# Patient Record
Sex: MALE | Race: WHITE | Employment: FULL TIME | URBAN - NONMETROPOLITAN AREA
[De-identification: names, ages, dates, MRNs, and addresses within clinical notes are randomized per-mention and may not be internally consistent; named-entity substitution may affect disease eponyms.]

---

## 2022-01-04 ENCOUNTER — HOSPITAL ENCOUNTER (EMERGENCY)
Age: 52
Discharge: HOME | End: 2022-01-04
Payer: COMMERCIAL

## 2022-01-04 VITALS
DIASTOLIC BLOOD PRESSURE: 88 MMHG | OXYGEN SATURATION: 98 % | SYSTOLIC BLOOD PRESSURE: 139 MMHG | RESPIRATION RATE: 16 BRPM | TEMPERATURE: 98.3 F | HEART RATE: 78 BPM

## 2022-01-04 DIAGNOSIS — U07.1 COVID-19 VIRUS INFECTION: Primary | ICD-10-CM

## 2022-01-04 LAB
FLU A ANTIGEN: NEGATIVE
FLU B ANTIGEN: NEGATIVE
SARS-COV-2, NAA: DETECTED

## 2022-01-04 PROCEDURE — G0463 HOSPITAL OUTPT CLINIC VISIT: HCPCS

## 2022-01-04 PROCEDURE — 99202 OFFICE O/P NEW SF 15 MIN: CPT | Performed by: EMERGENCY MEDICINE

## 2022-01-04 PROCEDURE — 99203 OFFICE O/P NEW LOW 30 MIN: CPT

## 2022-01-04 PROCEDURE — 87635 SARS-COV-2 COVID-19 AMP PRB: CPT

## 2022-01-04 PROCEDURE — 87804 INFLUENZA ASSAY W/OPTIC: CPT

## 2022-01-04 ASSESSMENT — ENCOUNTER SYMPTOMS
NAUSEA: 1
ABDOMINAL PAIN: 0
DIARRHEA: 1
SHORTNESS OF BREATH: 0
BACK PAIN: 0
COUGH: 1
RHINORRHEA: 1

## 2022-01-04 NOTE — ED PROVIDER NOTES
[facility]  Urgent Care Encounter       CHIEF COMPLAINT       Chief Complaint   Patient presents with    Fatigue    Cough    Nasal Congestion    Generalized Body Aches    Chills       Nurses Notes reviewed and I agree except as noted in the HPI. HISTORY OF PRESENT ILLNESS   Saul Pabon is a 54 y.o. male who presents for fatigue, occasional cough, nasal congestion, generalized body aches and chills. Symptoms started yesterday but today he was driving to work when the chills occurred. No known fever. He has had one diarrhea episode. No vomiting. Patient has not been vaccinated for COVID-19. He has been exposed to COVID-19. His mother-in-law passed away from COVID-19 3 weeks ago. HPI    REVIEW OF SYSTEMS     Review of Systems   Constitutional: Positive for chills and fatigue. HENT: Positive for rhinorrhea. Respiratory: Positive for cough. Negative for shortness of breath. Cardiovascular: Negative for chest pain. Gastrointestinal: Positive for diarrhea and nausea. Negative for abdominal pain. Musculoskeletal: Negative for back pain. Neurological: Positive for headaches. Negative for dizziness. Psychiatric/Behavioral: Negative for behavioral problems. PAST MEDICAL HISTORY   History reviewed. No pertinent past medical history. SURGICALHISTORY     Patient  has a past surgical history that includes Carpal tunnel release (Bilateral) and Colonoscopy. CURRENT MEDICATIONS       There are no discharge medications for this patient. ALLERGIES     Patient is is allergic to codeine. Patients   There is no immunization history on file for this patient. FAMILY HISTORY     Patient's family history is not on file. SOCIAL HISTORY     Patient  reports that he has never smoked. His smokeless tobacco use includes chew. He reports that he does not use drugs.     PHYSICAL EXAM     ED TRIAGE VITALS  BP: 139/88, Temp: 98.3 °F (36.8 °C), Pulse: 78, Resp: 16, SpO2: 98 %,There is no height or weight on file to calculate BMI.,No LMP for male patient. Physical Exam  Constitutional:       Appearance: Normal appearance. He is ill-appearing. He is not toxic-appearing. HENT:      Head: Normocephalic. Mouth/Throat:      Mouth: Mucous membranes are moist.      Pharynx: Oropharynx is clear. No oropharyngeal exudate or posterior oropharyngeal erythema. Cardiovascular:      Rate and Rhythm: Normal rate and regular rhythm. Pulses: Normal pulses. Heart sounds: Normal heart sounds. Pulmonary:      Effort: Pulmonary effort is normal. No respiratory distress. Breath sounds: Normal breath sounds. No wheezing or rhonchi. Abdominal:      General: Abdomen is flat. Bowel sounds are normal.   Skin:     General: Skin is warm and dry. Capillary Refill: Capillary refill takes less than 2 seconds. Neurological:      General: No focal deficit present. Mental Status: He is alert. Psychiatric:         Mood and Affect: Mood normal.         Behavior: Behavior normal.         DIAGNOSTIC RESULTS     Labs:  Results for orders placed or performed during the hospital encounter of 01/04/22   COVID-19, Rapid   Result Value Ref Range    SARS-CoV-2, MALICK DETECTED (AA) NOT DETECTED   Rapid influenza A/B antigens   Result Value Ref Range    Flu A Antigen Negative NEGATIVE    Flu B Antigen Negative NEGATIVE       IMAGING:    No orders to display         EKG:      URGENT CARE COURSE:     Vitals:    01/04/22 1639   BP: 139/88   Pulse: 78   Resp: 16   Temp: 98.3 °F (36.8 °C)   TempSrc: Temporal   SpO2: 98%       Medications - No data to display         PROCEDURES:  None    FINAL IMPRESSION      1. COVID-19 virus infection          DISPOSITION/ PLAN     Presents for COVID-19 viral infection. Patient has mild symptoms. O2 saturation 98%. No respiratory distress. Patient be discharged and advised to distance from others.   Advised to remain off work for total of 10 days from onset of symptoms. Over-the-counter medications as needed. Continue vitamin C, D, zinc as he is already taking at home. I did discuss monoclonal antibody treatment with the patient. Advised that he can contact his primary care provider if symptoms start to worsen and this is a consideration that he has for treatment. He is also advised to go to the emergency department for worsening cough, chest pain, shortness of breath or any new concerns. PATIENT REFERRED TO:  [address] [place] [id number]      DISCHARGE MEDICATIONS:  There are no discharge medications for this patient. There are no discharge medications for this patient. There are no discharge medications for this patient.       ParticANDREAS Cadena CNP    (Please note that portions of this note were completed with a voice recognition program. Efforts were made to edit the dictations but occasionally words are mis-transcribed.)          ParticANDREAS Cadena CNP  01/04/22 0466

## 2022-01-04 NOTE — Clinical Note
Haydee Nguyen was seen and treated in our emergency department on 1/4/2022. COVID19 virus is suspected. Per the CDC guidelines we recommend home isolation until the following conditions are all met:    1. At least 10 days have passed since symptoms first appeared and  2. At least 24 hours have passed since last fever without the use of fever-reducing medications and  3. Symptoms (e.g., cough, shortness of breath) have improved    If you have any questions or concerns, please don't hesitate to call.     He may return to work/school on 01/13/2022        José Antonio Matthew, APRN - CNP

## 2023-03-24 ENCOUNTER — HOSPITAL ENCOUNTER (INPATIENT)
Age: 53
LOS: 8 days | Discharge: HOME | End: 2023-04-01
Attending: EMERGENCY MEDICINE | Admitting: INTERNAL MEDICINE
Payer: COMMERCIAL

## 2023-03-24 ENCOUNTER — APPOINTMENT (OUTPATIENT)
Dept: GENERAL RADIOLOGY | Age: 53
End: 2023-03-24
Payer: COMMERCIAL

## 2023-03-24 DIAGNOSIS — N17.9 AKI (ACUTE KIDNEY INJURY) (HCC): ICD-10-CM

## 2023-03-24 DIAGNOSIS — A41.9 SEPTICEMIA (HCC): ICD-10-CM

## 2023-03-24 DIAGNOSIS — L03.116 CELLULITIS OF LEFT LOWER EXTREMITY: Primary | ICD-10-CM

## 2023-03-24 PROBLEM — L02.416 CELLULITIS AND ABSCESS OF LEFT LOWER EXTREMITY: Status: ACTIVE | Noted: 2023-03-24

## 2023-03-24 LAB
ANION GAP SERPL CALC-SCNC: 16 MEQ/L (ref 8–16)
BUN SERPL-MCNC: 38 MG/DL (ref 7–22)
CALCIUM SERPL-MCNC: 8.5 MG/DL (ref 8.5–10.5)
CHLORIDE SERPL-SCNC: 92 MEQ/L (ref 98–111)
CO2 SERPL-SCNC: 22 MEQ/L (ref 23–33)
CREAT SERPL-MCNC: 2.5 MG/DL (ref 0.4–1.2)
GFR SERPL CREATININE-BSD FRML MDRD: 30 ML/MIN/1.73M2
GLUCOSE SERPL-MCNC: 181 MG/DL (ref 70–108)
LACTIC ACID, SEPSIS: 3.2 MMOL/L (ref 0.5–1.9)
OSMOLALITY SERPL CALC.SUM OF ELEC: 274.4 MOSMOL/KG (ref 275–300)
POTASSIUM SERPL-SCNC: 3.3 MEQ/L (ref 3.5–5.2)
SODIUM SERPL-SCNC: 130 MEQ/L (ref 135–145)

## 2023-03-24 PROCEDURE — 87186 SC STD MICRODIL/AGAR DIL: CPT

## 2023-03-24 PROCEDURE — 85025 COMPLETE CBC W/AUTO DIFF WBC: CPT

## 2023-03-24 PROCEDURE — 83605 ASSAY OF LACTIC ACID: CPT

## 2023-03-24 PROCEDURE — 73590 X-RAY EXAM OF LOWER LEG: CPT

## 2023-03-24 PROCEDURE — 36415 COLL VENOUS BLD VENIPUNCTURE: CPT

## 2023-03-24 PROCEDURE — 87077 CULTURE AEROBIC IDENTIFY: CPT

## 2023-03-24 PROCEDURE — 99285 EMERGENCY DEPT VISIT HI MDM: CPT

## 2023-03-24 PROCEDURE — 87040 BLOOD CULTURE FOR BACTERIA: CPT

## 2023-03-24 PROCEDURE — 99222 1ST HOSP IP/OBS MODERATE 55: CPT | Performed by: PHYSICIAN ASSISTANT

## 2023-03-24 PROCEDURE — 93005 ELECTROCARDIOGRAM TRACING: CPT | Performed by: STUDENT IN AN ORGANIZED HEALTH CARE EDUCATION/TRAINING PROGRAM

## 2023-03-24 PROCEDURE — 80048 BASIC METABOLIC PNL TOTAL CA: CPT

## 2023-03-24 PROCEDURE — 87801 DETECT AGNT MULT DNA AMPLI: CPT

## 2023-03-24 PROCEDURE — 1200000003 HC TELEMETRY R&B

## 2023-03-24 RX ORDER — 0.9 % SODIUM CHLORIDE 0.9 %
1000 INTRAVENOUS SOLUTION INTRAVENOUS ONCE
Status: COMPLETED | OUTPATIENT
Start: 2023-03-24 | End: 2023-03-25

## 2023-03-24 RX ORDER — 0.9 % SODIUM CHLORIDE 0.9 %
1000 INTRAVENOUS SOLUTION INTRAVENOUS ONCE
Status: COMPLETED | OUTPATIENT
Start: 2023-03-25 | End: 2023-03-25

## 2023-03-24 RX ORDER — HYDROCODONE BITARTRATE AND ACETAMINOPHEN 5; 325 MG/1; MG/1
1 TABLET ORAL ONCE
Status: COMPLETED | OUTPATIENT
Start: 2023-03-24 | End: 2023-03-25

## 2023-03-24 ASSESSMENT — PAIN DESCRIPTION - LOCATION: LOCATION: LEG

## 2023-03-24 ASSESSMENT — PAIN DESCRIPTION - ORIENTATION: ORIENTATION: LEFT;LOWER

## 2023-03-24 ASSESSMENT — PAIN SCALES - GENERAL: PAINLEVEL_OUTOF10: 10

## 2023-03-24 ASSESSMENT — PAIN - FUNCTIONAL ASSESSMENT: PAIN_FUNCTIONAL_ASSESSMENT: 0-10

## 2023-03-25 ENCOUNTER — APPOINTMENT (OUTPATIENT)
Dept: ULTRASOUND IMAGING | Age: 53
End: 2023-03-25
Payer: COMMERCIAL

## 2023-03-25 ENCOUNTER — APPOINTMENT (OUTPATIENT)
Dept: INTERVENTIONAL RADIOLOGY/VASCULAR | Age: 53
End: 2023-03-25
Payer: COMMERCIAL

## 2023-03-25 LAB
ACB COMPLEX DNA BLD POS QL NAA+NON-PROBE: NOT DETECTED
ANION GAP SERPL CALC-SCNC: 14 MEQ/L (ref 8–16)
B FRAGILIS DNA BLD POS QL NAA+NON-PROBE: NOT DETECTED
BASOPHILS ABSOLUTE: 0 THOU/MM3 (ref 0–0.1)
BASOPHILS ABSOLUTE: 0.1 THOU/MM3 (ref 0–0.1)
BASOPHILS NFR BLD AUTO: 0.4 %
BASOPHILS NFR BLD AUTO: 0.8 %
BLACTX-M ISLT/SPM QL: ABNORMAL
BLAIMP ISLT/SPM QL: ABNORMAL
BLAKPC ISLT/SPM QL: ABNORMAL
BLAOXA-48-LIKE ISLT/SPM QL: ABNORMAL
BLAVIM ISLT/SPM QL: ABNORMAL
BOTTLE TYPE: ABNORMAL
BUN SERPL-MCNC: 44 MG/DL (ref 7–22)
C ALBICANS DNA BLD POS QL NAA+NON-PROBE: NOT DETECTED
C AURIS DNA BLD POS QL NAA+NON-PROBE: NOT DETECTED
C GATTII+NEOFOR DNA BLD POS QL NAA+N-PRB: NOT DETECTED
C GLABRATA DNA BLD POS QL NAA+NON-PROBE: NOT DETECTED
C KRUSEI DNA BLD POS QL NAA+NON-PROBE: NOT DETECTED
C PARAP DNA BLD POS QL NAA+NON-PROBE: NOT DETECTED
C TROPICLS DNA BLD POS QL NAA+NON-PROBE: NOT DETECTED
CA-I BLD ISE-SCNC: 0.94 MMOL/L (ref 1.12–1.32)
CALCIUM SERPL-MCNC: 7.4 MG/DL (ref 8.5–10.5)
CHLORIDE SERPL-SCNC: 97 MEQ/L (ref 98–111)
CO2 SERPL-SCNC: 24 MEQ/L (ref 23–33)
COAG NEG STAPH DNA BLD QL NAA+PROBE: NOT DETECTED
COLISTIN RES MCR-1 ISLT/SPM QL: ABNORMAL
CREAT SERPL-MCNC: 2.4 MG/DL (ref 0.4–1.2)
DEPRECATED RDW RBC AUTO: 42.9 FL (ref 35–45)
DEPRECATED RDW RBC AUTO: 43.8 FL (ref 35–45)
E CLOAC COMP DNA BLD POS NAA+NON-PROBE: NOT DETECTED
E COLI DNA BLD POS QL NAA+NON-PROBE: NOT DETECTED
E FAECALIS DNA BLD POS QL NAA+NON-PROBE: NOT DETECTED
E FAECIUM DNA BLD POS QL NAA+NON-PROBE: NOT DETECTED
EKG ATRIAL RATE: 107 BPM
EKG P AXIS: 10 DEGREES
EKG P-R INTERVAL: 130 MS
EKG Q-T INTERVAL: 326 MS
EKG QRS DURATION: 90 MS
EKG QTC CALCULATION (BAZETT): 435 MS
EKG R AXIS: 1 DEGREES
EKG T AXIS: 5 DEGREES
EKG VENTRICULAR RATE: 107 BPM
ENTEROBACTERALES DNA BLD POS NAA+N-PRB: NOT DETECTED
EOSINOPHIL NFR BLD AUTO: 0 %
EOSINOPHIL NFR BLD AUTO: 0 %
EOSINOPHILS ABSOLUTE: 0 THOU/MM3 (ref 0–0.4)
EOSINOPHILS ABSOLUTE: 0 THOU/MM3 (ref 0–0.4)
ERYTHROCYTE [DISTWIDTH] IN BLOOD BY AUTOMATED COUNT: 13.3 % (ref 11.5–14.5)
ERYTHROCYTE [DISTWIDTH] IN BLOOD BY AUTOMATED COUNT: 13.3 % (ref 11.5–14.5)
GFR SERPL CREATININE-BSD FRML MDRD: 32 ML/MIN/1.73M2
GLUCOSE SERPL-MCNC: 159 MG/DL (ref 70–108)
GP B STREP DNA SPEC QL NAA+PROBE: NOT DETECTED
GP B STREP DNA SPEC QL NAA+PROBE: NOT DETECTED
HAEM INFLU DNA BLD POS QL NAA+NON-PROBE: NOT DETECTED
HCT VFR BLD AUTO: 43 % (ref 42–52)
HCT VFR BLD AUTO: 46.9 % (ref 42–52)
HGB BLD-MCNC: 13.9 GM/DL (ref 14–18)
HGB BLD-MCNC: 15.6 GM/DL (ref 14–18)
IMM GRANULOCYTES # BLD AUTO: 0.08 THOU/MM3 (ref 0–0.07)
IMM GRANULOCYTES # BLD AUTO: 0.09 THOU/MM3 (ref 0–0.07)
IMM GRANULOCYTES NFR BLD AUTO: 0.6 %
IMM GRANULOCYTES NFR BLD AUTO: 0.7 %
K OXYTOCA DNA BLD POS QL NAA+NON-PROBE: NOT DETECTED
K OXYTOCA DNA BLD POS QL NAA+NON-PROBE: NOT DETECTED
KLEBSIELLA SP DNA BLD POS QL NAA+NON-PRB: NOT DETECTED
L MONOCYTOG DNA BLD POS QL NAA+NON-PROBE: NOT DETECTED
LACTATE SERPL-SCNC: 1.9 MMOL/L (ref 0.5–2)
LACTIC ACID, SEPSIS: 2.6 MMOL/L (ref 0.5–1.9)
LYMPHOCYTES ABSOLUTE: 0.6 THOU/MM3 (ref 1–4.8)
LYMPHOCYTES ABSOLUTE: 0.7 THOU/MM3 (ref 1–4.8)
LYMPHOCYTES NFR BLD AUTO: 5.1 %
LYMPHOCYTES NFR BLD AUTO: 5.3 %
MAGNESIUM SERPL-MCNC: 2.1 MG/DL (ref 1.6–2.4)
MCH RBC QN AUTO: 28.8 PG (ref 26–33)
MCH RBC QN AUTO: 29.3 PG (ref 26–33)
MCHC RBC AUTO-ENTMCNC: 32.3 GM/DL (ref 32.2–35.5)
MCHC RBC AUTO-ENTMCNC: 33.3 GM/DL (ref 32.2–35.5)
MCV RBC AUTO: 88.2 FL (ref 80–94)
MCV RBC AUTO: 89 FL (ref 80–94)
MECA ISLT/SPM QL: ABNORMAL
MECA+MECC+MREJ ISLT/SPM QL: ABNORMAL
MONOCYTES ABSOLUTE: 0.9 THOU/MM3 (ref 0.4–1.3)
MONOCYTES ABSOLUTE: 1 THOU/MM3 (ref 0.4–1.3)
MONOCYTES NFR BLD AUTO: 7.1 %
MONOCYTES NFR BLD AUTO: 7.6 %
N MEN DNA BLD POS QL NAA+NON-PROBE: NOT DETECTED
NDM: ABNORMAL
NEUTROPHILS NFR BLD AUTO: 86.2 %
NEUTROPHILS NFR BLD AUTO: 86.2 %
NRBC BLD AUTO-RTO: 0 /100 WBC
NRBC BLD AUTO-RTO: 0 /100 WBC
P AERUGINOSA DNA BLD POS NAA+NON-PROBE: NOT DETECTED
PLATELET # BLD AUTO: 142 THOU/MM3 (ref 130–400)
PLATELET # BLD AUTO: 170 THOU/MM3 (ref 130–400)
PLATELET BLD QL SMEAR: ADEQUATE
PLATELET BLD QL SMEAR: ADEQUATE
PMV BLD AUTO: 10.6 FL (ref 9.4–12.4)
PMV BLD AUTO: 10.7 FL (ref 9.4–12.4)
POTASSIUM SERPL-SCNC: 3.3 MEQ/L (ref 3.5–5.2)
PROTEUS SPP: NOT DETECTED
RBC # BLD AUTO: 4.83 MILL/MM3 (ref 4.7–6.1)
RBC # BLD AUTO: 5.32 MILL/MM3 (ref 4.7–6.1)
S AUREUS DNA BLD POS QL NAA+NON-PROBE: NOT DETECTED
S EPIDERMIDIS DNA BLD POS QL NAA+NON-PRB: NOT DETECTED
S LUGDUNENSIS DNA BLD POS QL NAA+NON-PRB: NOT DETECTED
S MALTOPHILIA DNA BLD POS QL NAA+NON-PRB: NOT DETECTED
S MARCESCENS DNA BLD POS NAA+NON-PROBE: NOT DETECTED
S PYO DNA THROAT QL NAA+PROBE: DETECTED
SALMONELLA DNA BLD POS QL NAA+NON-PROBE: NOT DETECTED
SCAN OF BLOOD SMEAR: NORMAL
SCAN OF BLOOD SMEAR: NORMAL
SEGMENTED NEUTROPHILS ABSOLUTE COUNT: 10.7 THOU/MM3 (ref 1.8–7.7)
SEGMENTED NEUTROPHILS ABSOLUTE COUNT: 11.6 THOU/MM3 (ref 1.8–7.7)
SODIUM SERPL-SCNC: 135 MEQ/L (ref 135–145)
SOURCE OF BLOOD CULTURE: ABNORMAL
STREPTOCOCCUS DNA BLD QL NAA+PROBE: DETECTED
VANA+VANB ISLT/SPM QL: ABNORMAL
WBC # BLD AUTO: 12.4 THOU/MM3 (ref 4.8–10.8)
WBC # BLD AUTO: 13.5 THOU/MM3 (ref 4.8–10.8)

## 2023-03-25 PROCEDURE — 99222 1ST HOSP IP/OBS MODERATE 55: CPT | Performed by: INTERNAL MEDICINE

## 2023-03-25 PROCEDURE — 93010 ELECTROCARDIOGRAM REPORT: CPT | Performed by: INTERNAL MEDICINE

## 2023-03-25 PROCEDURE — 1200000000 HC SEMI PRIVATE

## 2023-03-25 PROCEDURE — 83605 ASSAY OF LACTIC ACID: CPT

## 2023-03-25 PROCEDURE — 80048 BASIC METABOLIC PNL TOTAL CA: CPT

## 2023-03-25 PROCEDURE — 6360000002 HC RX W HCPCS: Performed by: PHYSICIAN ASSISTANT

## 2023-03-25 PROCEDURE — 6370000000 HC RX 637 (ALT 250 FOR IP): Performed by: STUDENT IN AN ORGANIZED HEALTH CARE EDUCATION/TRAINING PROGRAM

## 2023-03-25 PROCEDURE — 2580000003 HC RX 258: Performed by: INTERNAL MEDICINE

## 2023-03-25 PROCEDURE — 36415 COLL VENOUS BLD VENIPUNCTURE: CPT

## 2023-03-25 PROCEDURE — 85025 COMPLETE CBC W/AUTO DIFF WBC: CPT

## 2023-03-25 PROCEDURE — 2580000003 HC RX 258: Performed by: STUDENT IN AN ORGANIZED HEALTH CARE EDUCATION/TRAINING PROGRAM

## 2023-03-25 PROCEDURE — 82330 ASSAY OF CALCIUM: CPT

## 2023-03-25 PROCEDURE — 1200000003 HC TELEMETRY R&B

## 2023-03-25 PROCEDURE — 6360000002 HC RX W HCPCS: Performed by: STUDENT IN AN ORGANIZED HEALTH CARE EDUCATION/TRAINING PROGRAM

## 2023-03-25 PROCEDURE — 6360000002 HC RX W HCPCS

## 2023-03-25 PROCEDURE — 6360000002 HC RX W HCPCS: Performed by: INTERNAL MEDICINE

## 2023-03-25 PROCEDURE — 83735 ASSAY OF MAGNESIUM: CPT

## 2023-03-25 PROCEDURE — 76770 US EXAM ABDO BACK WALL COMP: CPT

## 2023-03-25 PROCEDURE — 2580000003 HC RX 258

## 2023-03-25 PROCEDURE — 2580000003 HC RX 258: Performed by: PHYSICIAN ASSISTANT

## 2023-03-25 PROCEDURE — 6370000000 HC RX 637 (ALT 250 FOR IP): Performed by: PHYSICIAN ASSISTANT

## 2023-03-25 PROCEDURE — 93971 EXTREMITY STUDY: CPT

## 2023-03-25 PROCEDURE — 99232 SBSQ HOSP IP/OBS MODERATE 35: CPT | Performed by: INTERNAL MEDICINE

## 2023-03-25 RX ORDER — ONDANSETRON 4 MG/1
4 TABLET, ORALLY DISINTEGRATING ORAL EVERY 8 HOURS PRN
Status: DISCONTINUED | OUTPATIENT
Start: 2023-03-25 | End: 2023-04-01 | Stop reason: HOSPADM

## 2023-03-25 RX ORDER — ACETAMINOPHEN 325 MG/1
650 TABLET ORAL EVERY 6 HOURS PRN
Status: DISCONTINUED | OUTPATIENT
Start: 2023-03-25 | End: 2023-04-01 | Stop reason: HOSPADM

## 2023-03-25 RX ORDER — POTASSIUM CHLORIDE 29.8 MG/ML
20 INJECTION INTRAVENOUS PRN
Status: DISCONTINUED | OUTPATIENT
Start: 2023-03-25 | End: 2023-03-27

## 2023-03-25 RX ORDER — SODIUM CHLORIDE 9 MG/ML
INJECTION, SOLUTION INTRAVENOUS CONTINUOUS
Status: DISCONTINUED | OUTPATIENT
Start: 2023-03-25 | End: 2023-03-27

## 2023-03-25 RX ORDER — ONDANSETRON 2 MG/ML
4 INJECTION INTRAMUSCULAR; INTRAVENOUS EVERY 6 HOURS PRN
Status: DISCONTINUED | OUTPATIENT
Start: 2023-03-25 | End: 2023-04-01 | Stop reason: HOSPADM

## 2023-03-25 RX ORDER — POLYETHYLENE GLYCOL 3350 17 G/17G
17 POWDER, FOR SOLUTION ORAL DAILY PRN
Status: DISCONTINUED | OUTPATIENT
Start: 2023-03-25 | End: 2023-04-01 | Stop reason: HOSPADM

## 2023-03-25 RX ORDER — SODIUM CHLORIDE 0.9 % (FLUSH) 0.9 %
5-40 SYRINGE (ML) INJECTION PRN
Status: DISCONTINUED | OUTPATIENT
Start: 2023-03-25 | End: 2023-04-01 | Stop reason: HOSPADM

## 2023-03-25 RX ORDER — ACETAMINOPHEN 650 MG/1
650 SUPPOSITORY RECTAL EVERY 6 HOURS PRN
Status: DISCONTINUED | OUTPATIENT
Start: 2023-03-25 | End: 2023-04-01 | Stop reason: HOSPADM

## 2023-03-25 RX ORDER — ENOXAPARIN SODIUM 100 MG/ML
30 INJECTION SUBCUTANEOUS EVERY 12 HOURS
Status: DISCONTINUED | OUTPATIENT
Start: 2023-03-25 | End: 2023-04-01 | Stop reason: HOSPADM

## 2023-03-25 RX ORDER — SODIUM CHLORIDE 9 MG/ML
INJECTION, SOLUTION INTRAVENOUS PRN
Status: DISCONTINUED | OUTPATIENT
Start: 2023-03-25 | End: 2023-04-01 | Stop reason: HOSPADM

## 2023-03-25 RX ORDER — SODIUM CHLORIDE 0.9 % (FLUSH) 0.9 %
5-40 SYRINGE (ML) INJECTION EVERY 12 HOURS SCHEDULED
Status: DISCONTINUED | OUTPATIENT
Start: 2023-03-25 | End: 2023-04-01 | Stop reason: HOSPADM

## 2023-03-25 RX ORDER — POTASSIUM CHLORIDE 7.45 MG/ML
10 INJECTION INTRAVENOUS PRN
Status: DISCONTINUED | OUTPATIENT
Start: 2023-03-25 | End: 2023-03-27

## 2023-03-25 RX ADMIN — AMPICILLIN SODIUM 2000 MG: 2 INJECTION, POWDER, FOR SOLUTION INTRAVENOUS at 20:53

## 2023-03-25 RX ADMIN — CALCIUM GLUCONATE 4000 MG: 98 INJECTION, SOLUTION INTRAVENOUS at 15:45

## 2023-03-25 RX ADMIN — ACETAMINOPHEN 650 MG: 325 TABLET ORAL at 15:52

## 2023-03-25 RX ADMIN — SODIUM CHLORIDE, PRESERVATIVE FREE 10 ML: 5 INJECTION INTRAVENOUS at 08:54

## 2023-03-25 RX ADMIN — ENOXAPARIN SODIUM 30 MG: 100 INJECTION SUBCUTANEOUS at 01:55

## 2023-03-25 RX ADMIN — Medication 2000 MG: at 00:01

## 2023-03-25 RX ADMIN — ENOXAPARIN SODIUM 30 MG: 100 INJECTION SUBCUTANEOUS at 14:02

## 2023-03-25 RX ADMIN — SODIUM CHLORIDE 1000 ML: 9 INJECTION, SOLUTION INTRAVENOUS at 00:03

## 2023-03-25 RX ADMIN — SODIUM CHLORIDE 1000 ML: 9 INJECTION, SOLUTION INTRAVENOUS at 00:02

## 2023-03-25 RX ADMIN — SODIUM CHLORIDE: 9 INJECTION, SOLUTION INTRAVENOUS at 01:52

## 2023-03-25 RX ADMIN — ACETAMINOPHEN 650 MG: 325 TABLET ORAL at 09:02

## 2023-03-25 RX ADMIN — HYDROCODONE BITARTRATE AND ACETAMINOPHEN 1 TABLET: 5; 325 TABLET ORAL at 00:01

## 2023-03-25 RX ADMIN — CEFAZOLIN 2000 MG: 10 INJECTION, POWDER, FOR SOLUTION INTRAVENOUS at 10:23

## 2023-03-25 RX ADMIN — VANCOMYCIN HYDROCHLORIDE 1750 MG: 5 INJECTION, POWDER, LYOPHILIZED, FOR SOLUTION INTRAVENOUS at 03:02

## 2023-03-25 ASSESSMENT — PAIN DESCRIPTION - DESCRIPTORS
DESCRIPTORS: THROBBING
DESCRIPTORS: THROBBING

## 2023-03-25 ASSESSMENT — ENCOUNTER SYMPTOMS
COUGH: 0
SHORTNESS OF BREATH: 0
COLOR CHANGE: 1
NAUSEA: 1
VOMITING: 1
ALLERGIC/IMMUNOLOGIC NEGATIVE: 1
EYES NEGATIVE: 1
RESPIRATORY NEGATIVE: 1
GASTROINTESTINAL NEGATIVE: 1

## 2023-03-25 ASSESSMENT — PAIN SCALES - GENERAL
PAINLEVEL_OUTOF10: 5
PAINLEVEL_OUTOF10: 5
PAINLEVEL_OUTOF10: 9
PAINLEVEL_OUTOF10: 7

## 2023-03-25 ASSESSMENT — PAIN DESCRIPTION - LOCATION
LOCATION: KNEE
LOCATION: LEG
LOCATION: LEG

## 2023-03-25 ASSESSMENT — PAIN DESCRIPTION - ORIENTATION
ORIENTATION: LEFT

## 2023-03-25 NOTE — PROGRESS NOTES
VN completed admission questions with pt wife at this time. Wife stated pt does not take any medications at home. Pt resting in bed with call light in reach. Educated on fall prevention and referred to page 13 of hospital handbook at bedside. Verbalized understanding.  No voiced questions or concerns

## 2023-03-25 NOTE — PROGRESS NOTES
[address] [facility] Pharmacokinetic Monitoring Service - Vancomycin     Jefferson Level is a 50 y.o. male starting on vancomycin therapy for cellulitis of LLE. Pharmacy consulted by Yanci Orozco for monitoring and adjustment. Target Concentration: Goal trough of 10-15 mg/L and AUC/DARÍO <500 mg*hr/L    Additional Antimicrobials: Ancef    Pertinent Laboratory Values: Wt Readings from Last 1 Encounters:   03/25/23 (!) 313 lb 0.9 oz (142 kg)     Temp Readings from Last 1 Encounters:   03/24/23 98.3 °F (36.8 °C) (Oral)     Estimated Creatinine Clearance: 50 mL/min (A) (based on SCr of 2.5 mg/dL (H)). Recent Labs     03/24/23  2238   CREATININE 2.5*   WBC 13.5*     Procalcitonin: none resulted    Pertinent Cultures:  Culture Date Source Results   03/24/23 BCx2    MRSA Nasal Swab: N/A. Non-respiratory infection. Plan:  Vancomycin 1750 mg iv now.   Random level ordered for tomorrow am to help with dosing due to elevated SCr  Pharmacy will continue to monitor patient and adjust therapy as indicated    Thank you for the consult,  Yuliya Badillo [facility]  3/25/2023 1:58 AM

## 2023-03-25 NOTE — ED PROVIDER NOTES
scleral icterus. Conjunctiva/sclera: Conjunctivae normal.   Cardiovascular:      Rate and Rhythm: Normal rate and regular rhythm. Pulses: Normal pulses. Heart sounds: Normal heart sounds. Pulmonary:      Effort: Pulmonary effort is normal. No respiratory distress. Breath sounds: Normal breath sounds. Abdominal:      General: Abdomen is flat. There is no distension. Palpations: Abdomen is soft. Tenderness: There is no abdominal tenderness. There is no guarding or rebound. Musculoskeletal:         General: Normal range of motion. Cervical back: Normal range of motion and neck supple. No rigidity. No muscular tenderness. Comments: Significant left lower extremity erythema to the lateral portion of the lower leg that is warm to the touch tender as well edematous. No active fluctuance no crepitus. DP pulses are 1+ bilaterally. Lymphadenopathy:      Cervical: No cervical adenopathy. Skin:     General: Skin is warm and dry. Capillary Refill: Capillary refill takes less than 2 seconds. Coloration: Skin is not jaundiced. Neurological:      General: No focal deficit present. Mental Status: He is alert and oriented to person, place, and time. Psychiatric:         Mood and Affect: Mood normal.         Behavior: Behavior normal.       FORMAL DIAGNOSTIC RESULTS     RADIOLOGY: Interpretation per the Radiologist below, if available at the time of this note (none if blank):    XR TIBIA FIBULA LEFT (2 VIEWS)   Final Result   Soft tissue edema.       This document has been electronically signed by: Junior Clinton MD on    03/24/2023 11:27 PM          LABS: (none if blank)  Labs Reviewed   CBC WITH AUTO DIFFERENTIAL - Abnormal; Notable for the following components:       Result Value    WBC 13.5 (*)     All other components within normal limits   BASIC METABOLIC PANEL - Abnormal; Notable for the following components:    Sodium 130 (*)     Potassium 3.3 (*)     Chloride 92 (*)     CO2 22 (*)     Glucose 181 (*)     BUN 38 (*)     Creatinine 2.5 (*)     All other components within normal limits   LACTATE, SEPSIS - Abnormal; Notable for the following components:    Lactic Acid, Sepsis 3.2 (*)     All other components within normal limits   OSMOLALITY - Abnormal; Notable for the following components:    Osmolality Calc 274.4 (*)     All other components within normal limits   GLOMERULAR FILTRATION RATE, ESTIMATED - Abnormal; Notable for the following components:    Est, Glom Filt Rate 30 (*)     All other components within normal limits   CULTURE, BLOOD 1   CULTURE, BLOOD 1   ANION GAP   LACTATE, SEPSIS       (Any cultures that may have been sent were not resulted at the time of this patient visit)    [facility] / ED COURSE:     External Documentation Reviewed:         Previous patient encounter documents & history available on EMR was reviewed: Patient was seen on 1/4/2020 for COVID-19. 1)           Differential Diagnosis includes (but not limited to):  Cellulitis, osteomyelitis, necrotizing fasciitis, DVT        Diagnoses Considered but I have low suspicion of:          Decision Rules/Clinical Scores utilized:               See Formal Diagnostic Results above for the lab and radiology tests and orders.     3)  Treatment and Disposition         ED Reassessment:  See ED course         Case discussed with consulting clinician: Geremias Stewart hospitalist         Shared Decision-Making was performed and disposition discussed with the        Patient/Family and questions answered          Social determinants of health impacting treatment or disposition:  none      Summary of Patient Presentation:      ED Course as of 03/24/23 2335   Fri Mar 24, 2023   2329 WBC(!): 13.5 [AL]   2331 Sodium(!): 130 [AL]   2331 Potassium(!): 3.3 [AL]   2331 Chloride(!): 92 [AL]   2331 CO2(!): 22 [AL]   2331 Glucose, Random(!): 181 [AL]   2331 BUN,BUNPL(!): 38 [AL]   2331 Creatinine(!): 2.5 [AL]

## 2023-03-25 NOTE — PLAN OF CARE
Problem: Pain  Goal: Verbalizes/displays adequate comfort level or baseline comfort level  Outcome: Progressing     Problem: Skin/Tissue Integrity  Goal: Absence of new skin breakdown  Description: 1. Monitor for areas of redness and/or skin breakdown  2. Assess vascular access sites hourly  3. Every 4-6 hours minimum:  Change oxygen saturation probe site  4. Every 4-6 hours:  If on nasal continuous positive airway pressure, respiratory therapy assess nares and determine need for appliance change or resting period. Outcome: Progressing     Problem: Infection - Adult  Goal: Absence of infection at discharge  Outcome: Progressing  Flowsheets (Taken 3/24/2023 2231)  Absence of infection at discharge:   Assess and monitor for signs and symptoms of infection   Monitor lab/diagnostic results   Care plan reviewed with patient and  Patient verbalize understanding of the plan of care and contribute to goal setting.

## 2023-03-25 NOTE — PROGRESS NOTES
PROGRESS NOTE      Patient:  Drea Childers      Unit/Bed:6K-23/023-A    YOB: 1970    MRN: [id number]       Acct: [de-identified]     PCP: Howie Wilkerson DO    Date of Admission: 3/24/2023      Assessment/Plan:      Active Hospital Problems    Diagnosis Date Noted    Cellulitis and abscess of left lower extremity [L03.116, L02.416] 03/24/2023       Cellulitis LLE: LLE noted warmth, erythema, mild edema. Continue Ancef for 2-3 more days. US BLE doppler negative for DVT 3/25/23. X-ray LLE unremarkable 2/24/23. Bacteremia/ SIRS criteria: Blood cultures growing Strep pyogenes 3/25/23. Tachycardic, elevated WBC 12.4, lactic acid elevated, LLE likely source of infection. LLE TTP, pt is unable to walk on it due to the pain. Afebrile. Will continue Ancef for 2-3 more days, d/c vancomycin and start Ampicillin 2g IV Q4H per pharmacy recommendations. Lactic acidosis: Pt noted to have elevated lactic acid. Will repeat LA in the AM. Continue NS  ml/ hr.    SHOLA: Cr 2.4 this morning. Likely pre-renal 2/2 poor PO intake, dehydration. Unclear of patient's history, pt is a poor historian. Nephrology consulted, appreciate recommendations. Renal US 3/25/23 negative for hydronephrosis. SHOLA lab work-up. Continue IVF. Hypokalemia: replace as needed    Hypocalcemia: replace as needed      Chief Complaint: leg swelling    Hospital Course:      \"Patient presents to the ED with left lower extremity swelling and redness for the past 3 days. The patient denies any recent injury, wounds, or rash on the lower extremity. The patient has no significant medical history including no history of DVT. There are no fevers, chills, or shortness of breath. Patient is admitted for left lower extremity cellulitis and DVT r/o.\"    3/25/23: Continue Ancef for LLE cellulitis. Blood cultures positive for S. Pyogenes. Will d/c Vancomycin, start Ampicillin. SHOLA noted, Cr 2.4 this morning. Nephrology consulted, appreciate recs. insight        Labs:   Recent Labs     03/24/23 2238 03/25/23 0439   WBC 13.5* 12.4*   HGB 15.6 13.9*   HCT 46.9 43.0    142     Recent Labs     03/24/23 2238 03/25/23 0439   * 135   K 3.3* 3.3*   CL 92* 97*   CO2 22* 24   BUN 38* 44*   CREATININE 2.5* 2.4*   CALCIUM 8.5 7.4*     No results for input(s): AST, ALT, BILIDIR, BILITOT, ALKPHOS in the last 72 hours. No results for input(s): INR in the last 72 hours. No results for input(s): Loy Massed in the last 72 hours. Urinalysis:    No results found for: Herman Scarce, BACTERIA, RBCUA, BLOODU, [address], [place] [address]    Radiology:  US RENAL COMPLETE   Final Result    No evidence of hydronephrosis. **This report has been created using voice recognition software. It may contain minor errors which are inherent in voice recognition technology. **      Final report electronically signed by Dr. Watt Check on 3/25/2023 1:46 PM      VL DUP LOWER EXTREMITY VENOUS LEFT   Final Result   No evidence of a DVT in the left lower extremity. **This report has been created using voice recognition software. It may contain minor errors which are inherent in voice recognition technology. **      Final report electronically signed by Dr. Watt Check on 3/25/2023 11:53 AM      XR TIBIA FIBULA LEFT (2 VIEWS)   Final Result   Soft tissue edema. This document has been electronically signed by: Bc Baltazar MD on    03/24/2023 11:27 PM          Diet: ADULT DIET;  Regular    DVT prophylaxis: [x] Lovenox                                 [] SCDs                                 [] SQ Heparin                                 [] Encourage ambulation           [] Already on Anticoagulation     Disposition:    [x] Home       [] TCU       [] Rehab       [] Psych       [] SNF       [] [place]       [] Other-    Code Status: Full Code    PT/OT Eval Status: working      Electronically signed by Francis Hearn DO on 3/25/2023 at

## 2023-03-25 NOTE — H&P
Concern    Not on file   Social History Narrative    Not on file     Social Determinants of Health     Financial Resource Strain: Not on file   Food Insecurity: Not on file   Transportation Needs: Not on file   Physical Activity: Not on file   Stress: Not on file   Social Connections: Not on file   Intimate Partner Violence: Not on file   Housing Stability: Not on file     FHX: History reviewed. No pertinent family history. Allergies:    Allergies   Allergen Reactions    Codeine      Other reaction(s): Unknown Reaction     Medications:     sodium chloride      sodium chloride 125 mL/hr at 03/25/23 0152      sodium chloride flush  5-40 mL IntraVENous 2 times per day    enoxaparin  30 mg SubCUTAneous Q12H    ceFAZolin  2,000 mg IntraVENous Q8H    vancomycin (VANCOCIN) intermittent dosing (placeholder)   Other RX Placeholder    vancomycin  1,750 mg IntraVENous Once     sodium chloride flush, 5-40 mL, PRN  sodium chloride, , PRN  ondansetron, 4 mg, Q8H PRN   Or  ondansetron, 4 mg, Q6H PRN  polyethylene glycol, 17 g, Daily PRN  acetaminophen, 650 mg, Q6H PRN   Or  acetaminophen, 650 mg, Q6H PRN      Labs:   Recent Results (from the past 24 hour(s))   EKG 12 Lead    Collection Time: 03/24/23 10:27 PM   Result Value Ref Range    Ventricular Rate 107 BPM    Atrial Rate 107 BPM    P-R Interval 130 ms    QRS Duration 90 ms    Q-T Interval 326 ms    QTc Calculation (Bazett) 435 ms    P Axis 10 degrees    R Axis 1 degrees    T Axis 5 degrees   CBC with Auto Differential    Collection Time: 03/24/23 10:38 PM   Result Value Ref Range    WBC 13.5 (H) 4.8 - 10.8 thou/mm3    RBC 5.32 4.70 - 6.10 mill/mm3    Hemoglobin 15.6 14.0 - 18.0 gm/dl    Hematocrit 46.9 42.0 - 52.0 %    MCV 88.2 80.0 - 94.0 fL    MCH 29.3 26.0 - 33.0 pg    MCHC 33.3 32.2 - 35.5 gm/dl    RDW-CV 13.3 11.5 - 14.5 %    RDW-SD 42.9 35.0 - 45.0 fL    Platelets 225 920 - 662 thou/mm3    MPV 10.7 9.4 - 12.4 fL    Seg Neutrophils 86.2 %    Lymphocytes 5.3 % use.  Cardiac: RRR, no murmur, 2+ pulses  Abdomen: soft. Nontender. Bowel sounds active  Extremities:  No clubbing, cyanosis x 4, no edema    Vasculature: capillary refill < 3 seconds. Skin:  warm and dry. no visible rashes, left lower extremity there is a well demarcated area of redness that is mostly anterior but does not extend onto the foot  Psych:  Alert and oriented x3. Affect appropriate  Lymph:  No supraclavicular adenopathy. Neurologic:  CN II-XII grossly intact. No focal deficit. Data: (All radiographs, tracings, PFTs, and imaging are personally viewed and interpreted unless otherwise noted).    EKG: rhythm: sinus tachycardia, nlxj=323, zyhh=116 bpm, oq=946 ms, qrs=90 ms, jc=973 ms, axis=10 degrees        Electronically signed by  Melissa Veronica PA-C

## 2023-03-25 NOTE — PROGRESS NOTES
Pharmacy Note  [facility] Result    Viviana Pierson is a 50 y.o. male, with a positive blood culture result    Communication received from Eva, laboratory employee on 3/25/2023 at 1:54 PM    First Gram stain result: gram positive cocci in chains or pairs    Bio[facility] BCID result: Strep pyogenes    [employer] BCID and gram stain congruent? Yes    Suspected source? SSTI    Patient chart has been reviewed for signs/symptoms of infection: Yes  /85   Pulse (!) 106   Temp 98.6 °F (37 °C) (Oral)   Resp 18   Ht 5' 9\" (1.753 m)   Wt (!) 313 lb 0.9 oz (142 kg)   SpO2 95%   BMI 46.23 kg/m²   Lab Results   Component Value Date    WBC 12.4 (H) 03/25/2023     Allergies reviewed  Codeine    Renal function reviewed  Estimated Creatinine Clearance: 52 mL/min (A) (based on SCr of 2.4 mg/dL (H)). Current antibiotic regimen: Ancef, Vanc    Intervention needed: Yes    Individual contacted: Dr. Joseph Ashton    Recommendations: Stop Ancef and Vanc, start Ampicillin 2g or PCN 3 million units q4h    Recommendations accepted? Yes (start Ampicillin, stop Ancef and Vanc)    Natacha Ashby, [facility]  3/25/2023 1:54 PM

## 2023-03-25 NOTE — ED TRIAGE NOTES
Pt presents to the ED with complaints of left leg swelling that started about 2 days ago. Pt wife states that all the pt has been doing is sleeping for the past two days.  IV established and redness outlined

## 2023-03-25 NOTE — CONSULTS
[facility]          [facility]        [address]        [place] [facility]        [phone]           Inpatient Initial consult note         3/25/2023 11:08 AM      Patient Name:   Adriana Mcneil  YOB: 1970  Primary Care Physician:  Nicky Louise DO   Admit Date:    3/24/2023 10:23 PM    Consultation requested by : Fabrice Mcneill MD    Reason for Consult : Nephrology following for SHOLA/CKD. History of presenting illness :Adriana Mcneil is a 50 y.o.   male with Past Medical History as stated below presented with a chief complaint of Leg Swelling and Altered Mental Status   on 3/24/2023 . Patient is a hard of hearing and somewhat difficult to communicate. Patient presented with chief complaints of swelling left lower extremity which has been ongoing for the last 3 days denies any recent injury or wounds. No associated symptoms of fever chills nausea vomiting abdominal pain. No other modifying factors. Patient denies use of any nonsteroidal anti-inflammatories and also no recent use of antibiotics. Patient denies any history of chronic kidney disease. On arrival to the ED he was diagnosed with a lower extremity cellulitis and started on antibiotics. There was no improvement in renal function since presentation so nephrology has been consulted for further evaluation and management    Past History:  History reviewed. No pertinent past medical history.   Past Surgical History:   Procedure Laterality Date    CARPAL TUNNEL RELEASE Bilateral     COLONOSCOPY       Social History     Socioeconomic History    Marital status:      Spouse name: Not on file    Number of children: Not on file    Years of education: Not on file    Highest education level: Not on file   Occupational History    Not on file   Tobacco Use    Smoking status: Never    Smokeless tobacco: Current     Types: Chew   Vaping Use    Vaping Use: Never used   Substance and Sexual not show any acute abnormalities but does have quite large kidneys, may be an indication of diabetes  We will continue IV fluids and send urinalysis urine protein creatinine ratio as well  Electrolytes -hyponatremia secondary to renal dysfunction improved with IV fluids  Hypokalemia we will replace and monitor  Mild metabolic acidosis appears to be improved also has some mild lactic acidosis  Blood pressure does not officially carry a diagnosis of hypertension but blood pressure appears to be running slightly higher  Left leg cellulitis on antibiotics appears to be getting better  Hypocalcemia-check an albumin level and also a PTH and vitamin D level  Meds reviewed and discussed with patient    Nadine Turcios MD  [facility]    This report has been created using voice recognition software.  It may contain minor errors which are inherent in voice recognition technology

## 2023-03-25 NOTE — ED NOTES
ED to inpatient nurses report    Chief Complaint   Patient presents with    Leg Swelling    Altered Mental Status      Present to ED from home  LOC: alert and orientated to name, place, date  Vital signs   Vitals:    03/24/23 2232 03/24/23 2344   BP: (!) 146/86 116/82   Pulse: (!) 107 (!) 103   Resp: 20 30   Temp: 98.3 °F (36.8 °C)    TempSrc: Oral    SpO2: 95% 96%   Weight: (!) 315 lb (142.9 kg)       Oxygen Baseline RA    Current needs required RA   LDAs:   Peripheral IV 03/24/23 Left Antecubital (Active)   Site Assessment Clean, dry & intact 03/24/23 2251   Line Status Blood return noted;Normal saline locked; Flushed;Specimen collected 03/24/23 2251   Phlebitis Assessment No symptoms 03/24/23 2251   Infiltration Assessment 0 03/24/23 2251   Dressing Status Clean, dry & intact 03/24/23 2251     Mobility: Requires assistance * 2  Pending ED orders: NA  Present condition: Pt resting on cot.  Pt is deaf, but is able to read lips    C-SSRS Risk of Suicide: No Risk  Swallow Screening    Preferred Language: English     Electronically signed by Krystyna Eden RN on 3/24/2023 at 11:47 PM       Krystyna Eden RN  03/24/23 8243

## 2023-03-26 LAB
25(OH)D3 SERPL-MCNC: 9 NG/ML (ref 30–100)
ALBUMIN SERPL BCG-MCNC: 2.7 G/DL (ref 3.5–5.1)
ALBUMIN SERPL BCG-MCNC: 3 G/DL (ref 3.5–5.1)
ALP SERPL-CCNC: 88 U/L (ref 38–126)
ALT SERPL W/O P-5'-P-CCNC: 20 U/L (ref 11–66)
ANION GAP SERPL CALC-SCNC: 11 MEQ/L (ref 8–16)
AST SERPL-CCNC: 35 U/L (ref 5–40)
BACTERIA: ABNORMAL
BILIRUB SERPL-MCNC: 0.6 MG/DL (ref 0.3–1.2)
BILIRUB UR QL STRIP: NEGATIVE
BUN SERPL-MCNC: 36 MG/DL (ref 7–22)
CA-I BLD ISE-SCNC: 0.8 MMOL/L (ref 1.12–1.32)
CA-I BLD ISE-SCNC: 1.04 MMOL/L (ref 1.12–1.32)
CALCIUM SERPL-MCNC: 8.3 MG/DL (ref 8.5–10.5)
CASTS #/AREA URNS LPF: ABNORMAL /LPF
CASTS #/AREA URNS LPF: ABNORMAL /LPF
CHARACTER UR: CLEAR
CHARCOAL URNS QL MICRO: ABNORMAL
CHLORIDE 24H UR-SRATE: 23 MEQ/L
CHLORIDE SERPL-SCNC: 95 MEQ/L (ref 98–111)
CO2 SERPL-SCNC: 26 MEQ/L (ref 23–33)
COLOR UR: YELLOW
CREAT SERPL-MCNC: 1.3 MG/DL (ref 0.4–1.2)
CREAT UR-MCNC: 129.3 MG/DL
CREAT UR-MCNC: 129.3 MG/DL
CRYSTALS URNS QL MICRO: ABNORMAL
DEPRECATED MEAN GLUCOSE BLD GHB EST-ACNC: 126 MG/DL (ref 70–126)
DEPRECATED RDW RBC AUTO: 44.6 FL (ref 35–45)
EPITHELIAL CELLS, UA: ABNORMAL /HPF
ERYTHROCYTE [DISTWIDTH] IN BLOOD BY AUTOMATED COUNT: 13.7 % (ref 11.5–14.5)
GFR SERPL CREATININE-BSD FRML MDRD: > 60 ML/MIN/1.73M2
GLUCOSE SERPL-MCNC: 174 MG/DL (ref 70–108)
GLUCOSE UR QL STRIP.AUTO: NEGATIVE MG/DL
HBA1C MFR BLD HPLC: 6.2 % (ref 4.4–6.4)
HCT VFR BLD AUTO: 41.9 % (ref 42–52)
HGB BLD-MCNC: 14.1 GM/DL (ref 14–18)
HGB UR QL STRIP.AUTO: ABNORMAL
KETONES UR QL STRIP.AUTO: NEGATIVE
LACTATE SERPL-SCNC: 2.2 MMOL/L (ref 0.5–2)
LACTATE SERPL-SCNC: 2.3 MMOL/L (ref 0.5–2)
LEUKOCYTE ESTERASE UR QL STRIP.AUTO: NEGATIVE
MCH RBC QN AUTO: 29.9 PG (ref 26–33)
MCHC RBC AUTO-ENTMCNC: 33.7 GM/DL (ref 32.2–35.5)
MCV RBC AUTO: 88.8 FL (ref 80–94)
MICROALBUMIN UR-MCNC: 6.59 MG/DL
MICROALBUMIN/CREAT RATIO PNL UR: 51 MG/G (ref 0–30)
NITRITE UR QL STRIP.AUTO: NEGATIVE
PH UR STRIP.AUTO: 6 [PH] (ref 5–9)
PLATELET # BLD AUTO: 166 THOU/MM3 (ref 130–400)
PMV BLD AUTO: 11.2 FL (ref 9.4–12.4)
POTASSIUM SERPL-SCNC: 3.4 MEQ/L (ref 3.5–5.2)
POTASSIUM SERPL-SCNC: 3.6 MEQ/L (ref 3.5–5.2)
POTASSIUM UR-SCNC: 25 MEQ/L
PROT SERPL-MCNC: 5.7 G/DL (ref 6.1–8)
PROT UR STRIP.AUTO-MCNC: 100 MG/DL
PROT UR-MCNC: 69.8 MG/DL
PROT/CREAT 24H UR: 0.54 MG/G{CREAT}
PTH-INTACT SERPL-MCNC: 27.4 PG/ML (ref 15–65)
RBC # BLD AUTO: 4.72 MILL/MM3 (ref 4.7–6.1)
RBC #/AREA URNS HPF: ABNORMAL /HPF
RENAL EPI CELLS #/AREA URNS HPF: ABNORMAL /[HPF]
SODIUM SERPL-SCNC: 132 MEQ/L (ref 135–145)
SODIUM UR-SCNC: < 20 MEQ/L
SPECIFIC GRAVITY UA: 1.03 (ref 1–1.03)
UROBILINOGEN, URINE: 0.2 EU/DL (ref 0–1)
WBC # BLD AUTO: 12.5 THOU/MM3 (ref 4.8–10.8)
WBC #/AREA URNS HPF: ABNORMAL /HPF
YEAST LIKE FUNGI URNS QL MICRO: ABNORMAL

## 2023-03-26 PROCEDURE — 82043 UR ALBUMIN QUANTITATIVE: CPT

## 2023-03-26 PROCEDURE — 82040 ASSAY OF SERUM ALBUMIN: CPT

## 2023-03-26 PROCEDURE — 6370000000 HC RX 637 (ALT 250 FOR IP): Performed by: PHYSICIAN ASSISTANT

## 2023-03-26 PROCEDURE — 82306 VITAMIN D 25 HYDROXY: CPT

## 2023-03-26 PROCEDURE — 84133 ASSAY OF URINE POTASSIUM: CPT

## 2023-03-26 PROCEDURE — 84300 ASSAY OF URINE SODIUM: CPT

## 2023-03-26 PROCEDURE — 99233 SBSQ HOSP IP/OBS HIGH 50: CPT | Performed by: INTERNAL MEDICINE

## 2023-03-26 PROCEDURE — 82330 ASSAY OF CALCIUM: CPT

## 2023-03-26 PROCEDURE — 36415 COLL VENOUS BLD VENIPUNCTURE: CPT

## 2023-03-26 PROCEDURE — 6360000002 HC RX W HCPCS: Performed by: PHYSICIAN ASSISTANT

## 2023-03-26 PROCEDURE — 6370000000 HC RX 637 (ALT 250 FOR IP): Performed by: INTERNAL MEDICINE

## 2023-03-26 PROCEDURE — 2580000003 HC RX 258: Performed by: PHYSICIAN ASSISTANT

## 2023-03-26 PROCEDURE — 83605 ASSAY OF LACTIC ACID: CPT

## 2023-03-26 PROCEDURE — 1200000000 HC SEMI PRIVATE

## 2023-03-26 PROCEDURE — 2580000003 HC RX 258

## 2023-03-26 PROCEDURE — 1200000003 HC TELEMETRY R&B

## 2023-03-26 PROCEDURE — 85027 COMPLETE CBC AUTOMATED: CPT

## 2023-03-26 PROCEDURE — 94640 AIRWAY INHALATION TREATMENT: CPT

## 2023-03-26 PROCEDURE — 82570 ASSAY OF URINE CREATININE: CPT

## 2023-03-26 PROCEDURE — 2500000003 HC RX 250 WO HCPCS: Performed by: PHYSICIAN ASSISTANT

## 2023-03-26 PROCEDURE — 6360000002 HC RX W HCPCS: Performed by: INTERNAL MEDICINE

## 2023-03-26 PROCEDURE — 2580000003 HC RX 258: Performed by: STUDENT IN AN ORGANIZED HEALTH CARE EDUCATION/TRAINING PROGRAM

## 2023-03-26 PROCEDURE — 6360000002 HC RX W HCPCS

## 2023-03-26 PROCEDURE — 6360000002 HC RX W HCPCS: Performed by: STUDENT IN AN ORGANIZED HEALTH CARE EDUCATION/TRAINING PROGRAM

## 2023-03-26 PROCEDURE — 83970 ASSAY OF PARATHORMONE: CPT

## 2023-03-26 PROCEDURE — 99232 SBSQ HOSP IP/OBS MODERATE 35: CPT | Performed by: INTERNAL MEDICINE

## 2023-03-26 PROCEDURE — 81001 URINALYSIS AUTO W/SCOPE: CPT

## 2023-03-26 PROCEDURE — 84156 ASSAY OF PROTEIN URINE: CPT

## 2023-03-26 PROCEDURE — A4216 STERILE WATER/SALINE, 10 ML: HCPCS | Performed by: PHYSICIAN ASSISTANT

## 2023-03-26 PROCEDURE — 94760 N-INVAS EAR/PLS OXIMETRY 1: CPT

## 2023-03-26 PROCEDURE — 2580000003 HC RX 258: Performed by: INTERNAL MEDICINE

## 2023-03-26 PROCEDURE — 84132 ASSAY OF SERUM POTASSIUM: CPT

## 2023-03-26 PROCEDURE — 82436 ASSAY OF URINE CHLORIDE: CPT

## 2023-03-26 PROCEDURE — 83036 HEMOGLOBIN GLYCOSYLATED A1C: CPT

## 2023-03-26 PROCEDURE — 80053 COMPREHEN METABOLIC PANEL: CPT

## 2023-03-26 RX ORDER — 0.9 % SODIUM CHLORIDE 0.9 %
500 INTRAVENOUS SOLUTION INTRAVENOUS ONCE
Status: COMPLETED | OUTPATIENT
Start: 2023-03-26 | End: 2023-03-26

## 2023-03-26 RX ORDER — ERGOCALCIFEROL 1.25 MG/1
50000 CAPSULE ORAL WEEKLY
Status: DISCONTINUED | OUTPATIENT
Start: 2023-03-26 | End: 2023-04-01 | Stop reason: HOSPADM

## 2023-03-26 RX ORDER — POTASSIUM CHLORIDE 7.45 MG/ML
10 INJECTION INTRAVENOUS
Status: COMPLETED | OUTPATIENT
Start: 2023-03-26 | End: 2023-03-26

## 2023-03-26 RX ORDER — AMLODIPINE BESYLATE 2.5 MG/1
2.5 TABLET ORAL DAILY
Status: DISCONTINUED | OUTPATIENT
Start: 2023-03-26 | End: 2023-03-29

## 2023-03-26 RX ORDER — CLINDAMYCIN PHOSPHATE 900 MG/50ML
900 INJECTION INTRAVENOUS EVERY 8 HOURS
Status: DISCONTINUED | OUTPATIENT
Start: 2023-03-26 | End: 2023-04-01

## 2023-03-26 RX ORDER — ACETAMINOPHEN 325 MG/1
325 TABLET ORAL ONCE
Status: COMPLETED | OUTPATIENT
Start: 2023-03-26 | End: 2023-03-26

## 2023-03-26 RX ORDER — CALCIUM GLUCONATE 20 MG/ML
2000 INJECTION, SOLUTION INTRAVENOUS ONCE
Status: COMPLETED | OUTPATIENT
Start: 2023-03-26 | End: 2023-03-26

## 2023-03-26 RX ORDER — ALBUTEROL SULFATE 90 UG/1
2 AEROSOL, METERED RESPIRATORY (INHALATION)
Status: DISCONTINUED | OUTPATIENT
Start: 2023-03-26 | End: 2023-03-27

## 2023-03-26 RX ORDER — METHYLPREDNISOLONE SODIUM SUCCINATE 125 MG/2ML
125 INJECTION, POWDER, LYOPHILIZED, FOR SOLUTION INTRAMUSCULAR; INTRAVENOUS ONCE
Status: COMPLETED | OUTPATIENT
Start: 2023-03-26 | End: 2023-03-26

## 2023-03-26 RX ORDER — IPRATROPIUM BROMIDE AND ALBUTEROL SULFATE 2.5; .5 MG/3ML; MG/3ML
1 SOLUTION RESPIRATORY (INHALATION)
Status: DISCONTINUED | OUTPATIENT
Start: 2023-03-26 | End: 2023-03-26 | Stop reason: CLARIF

## 2023-03-26 RX ORDER — CETIRIZINE HYDROCHLORIDE 10 MG/1
10 TABLET ORAL DAILY
Status: DISCONTINUED | OUTPATIENT
Start: 2023-03-26 | End: 2023-04-01 | Stop reason: HOSPADM

## 2023-03-26 RX ORDER — IPRATROPIUM BROMIDE AND ALBUTEROL SULFATE 2.5; .5 MG/3ML; MG/3ML
1 SOLUTION RESPIRATORY (INHALATION)
Status: DISCONTINUED | OUTPATIENT
Start: 2023-03-26 | End: 2023-03-27

## 2023-03-26 RX ORDER — DIPHENHYDRAMINE HYDROCHLORIDE 50 MG/ML
50 INJECTION INTRAMUSCULAR; INTRAVENOUS ONCE
Status: COMPLETED | OUTPATIENT
Start: 2023-03-26 | End: 2023-03-26

## 2023-03-26 RX ADMIN — DIPHENHYDRAMINE HYDROCHLORIDE 50 MG: 50 INJECTION, SOLUTION INTRAMUSCULAR; INTRAVENOUS at 20:24

## 2023-03-26 RX ADMIN — CLINDAMYCIN IN 5 PERCENT DEXTROSE 900 MG: 18 INJECTION, SOLUTION INTRAVENOUS at 03:54

## 2023-03-26 RX ADMIN — SODIUM CHLORIDE 500 ML: 9 INJECTION, SOLUTION INTRAVENOUS at 02:48

## 2023-03-26 RX ADMIN — ACETAMINOPHEN 650 MG: 325 TABLET ORAL at 01:24

## 2023-03-26 RX ADMIN — AMPICILLIN SODIUM 2000 MG: 2 INJECTION, POWDER, FOR SOLUTION INTRAVENOUS at 12:04

## 2023-03-26 RX ADMIN — CALCIUM GLUCONATE 4000 MG: 98 INJECTION, SOLUTION INTRAVENOUS at 15:02

## 2023-03-26 RX ADMIN — SODIUM CHLORIDE, PRESERVATIVE FREE 10 ML: 5 INJECTION INTRAVENOUS at 23:05

## 2023-03-26 RX ADMIN — ACETAMINOPHEN 650 MG: 325 TABLET ORAL at 08:51

## 2023-03-26 RX ADMIN — SODIUM CHLORIDE 500 ML: 9 INJECTION, SOLUTION INTRAVENOUS at 01:41

## 2023-03-26 RX ADMIN — AMPICILLIN SODIUM 2000 MG: 2 INJECTION, POWDER, FOR SOLUTION INTRAVENOUS at 15:43

## 2023-03-26 RX ADMIN — POTASSIUM CHLORIDE 10 MEQ: 7.46 INJECTION, SOLUTION INTRAVENOUS at 03:53

## 2023-03-26 RX ADMIN — ENOXAPARIN SODIUM 30 MG: 100 INJECTION SUBCUTANEOUS at 01:50

## 2023-03-26 RX ADMIN — POTASSIUM CHLORIDE 10 MEQ: 7.46 INJECTION, SOLUTION INTRAVENOUS at 02:39

## 2023-03-26 RX ADMIN — ALBUTEROL SULFATE 2 PUFF: 90 AEROSOL, METERED RESPIRATORY (INHALATION) at 19:08

## 2023-03-26 RX ADMIN — AMPICILLIN SODIUM 2000 MG: 2 INJECTION, POWDER, FOR SOLUTION INTRAVENOUS at 05:08

## 2023-03-26 RX ADMIN — AMPICILLIN SODIUM 2000 MG: 2 INJECTION, POWDER, FOR SOLUTION INTRAVENOUS at 08:26

## 2023-03-26 RX ADMIN — POTASSIUM CHLORIDE 10 MEQ: 7.46 INJECTION, SOLUTION INTRAVENOUS at 06:08

## 2023-03-26 RX ADMIN — FAMOTIDINE 20 MG: 10 INJECTION INTRAVENOUS at 20:28

## 2023-03-26 RX ADMIN — AMLODIPINE BESYLATE 2.5 MG: 2.5 TABLET ORAL at 13:25

## 2023-03-26 RX ADMIN — ERGOCALCIFEROL 50000 UNITS: 1.25 CAPSULE ORAL at 13:25

## 2023-03-26 RX ADMIN — IPRATROPIUM BROMIDE AND ALBUTEROL SULFATE 1 AMPULE: .5; 3 SOLUTION RESPIRATORY (INHALATION) at 20:08

## 2023-03-26 RX ADMIN — ACETAMINOPHEN 325 MG: 325 TABLET ORAL at 01:35

## 2023-03-26 RX ADMIN — HYDROMORPHONE HYDROCHLORIDE 0.5 MG: 1 INJECTION, SOLUTION INTRAMUSCULAR; INTRAVENOUS; SUBCUTANEOUS at 02:50

## 2023-03-26 RX ADMIN — CLINDAMYCIN IN 5 PERCENT DEXTROSE 900 MG: 18 INJECTION, SOLUTION INTRAVENOUS at 12:08

## 2023-03-26 RX ADMIN — SODIUM CHLORIDE: 9 INJECTION, SOLUTION INTRAVENOUS at 20:23

## 2023-03-26 RX ADMIN — SODIUM CHLORIDE 500 ML: 9 INJECTION, SOLUTION INTRAVENOUS at 20:21

## 2023-03-26 RX ADMIN — POTASSIUM CHLORIDE 10 MEQ: 7.46 INJECTION, SOLUTION INTRAVENOUS at 05:06

## 2023-03-26 RX ADMIN — AMPICILLIN SODIUM 2000 MG: 2 INJECTION, POWDER, FOR SOLUTION INTRAVENOUS at 01:20

## 2023-03-26 RX ADMIN — METHYLPREDNISOLONE SODIUM SUCCINATE 125 MG: 125 INJECTION, POWDER, FOR SOLUTION INTRAMUSCULAR; INTRAVENOUS at 20:20

## 2023-03-26 RX ADMIN — CALCIUM GLUCONATE 2000 MG: 20 INJECTION, SOLUTION INTRAVENOUS at 05:03

## 2023-03-26 RX ADMIN — IPRATROPIUM BROMIDE 2 PUFF: 17 AEROSOL, METERED RESPIRATORY (INHALATION) at 19:08

## 2023-03-26 RX ADMIN — SODIUM CHLORIDE: 9 INJECTION, SOLUTION INTRAVENOUS at 12:05

## 2023-03-26 RX ADMIN — ENOXAPARIN SODIUM 30 MG: 100 INJECTION SUBCUTANEOUS at 13:25

## 2023-03-26 ASSESSMENT — PAIN DESCRIPTION - ORIENTATION
ORIENTATION: LEFT

## 2023-03-26 ASSESSMENT — PAIN DESCRIPTION - LOCATION
LOCATION: LEG

## 2023-03-26 ASSESSMENT — PAIN SCALES - GENERAL
PAINLEVEL_OUTOF10: 9
PAINLEVEL_OUTOF10: 3
PAINLEVEL_OUTOF10: 9
PAINLEVEL_OUTOF10: 9

## 2023-03-26 ASSESSMENT — PAIN DESCRIPTION - FREQUENCY: FREQUENCY: INTERMITTENT

## 2023-03-26 ASSESSMENT — PAIN DESCRIPTION - DESCRIPTORS
DESCRIPTORS: SHARP;THROBBING
DESCRIPTORS: ACHING;SHARP;THROBBING
DESCRIPTORS: ACHING
DESCRIPTORS: SHARP;THROBBING

## 2023-03-26 ASSESSMENT — PAIN - FUNCTIONAL ASSESSMENT
PAIN_FUNCTIONAL_ASSESSMENT: PREVENTS OR INTERFERES SOME ACTIVE ACTIVITIES AND ADLS
PAIN_FUNCTIONAL_ASSESSMENT: PREVENTS OR INTERFERES SOME ACTIVE ACTIVITIES AND ADLS

## 2023-03-26 ASSESSMENT — PAIN DESCRIPTION - ONSET: ONSET: ON-GOING

## 2023-03-26 ASSESSMENT — PAIN DESCRIPTION - PAIN TYPE: TYPE: ACUTE PAIN

## 2023-03-26 NOTE — PLAN OF CARE
Problem: Respiratory - Adult  Goal: Clear lung sounds  Outcome: Progressing   Continue inhaled txs. Patient mutually agrees.

## 2023-03-26 NOTE — PROGRESS NOTES
Behavior: Behavior normal.       Labs:   Recent Labs     03/24/23 2238 03/25/23  0439   WBC 13.5* 12.4*   HGB 15.6 13.9*   HCT 46.9 43.0    142     Recent Labs     03/24/23 2238 03/25/23 0439 03/26/23  0934   * 135 132*   K 3.3* 3.3* 3.6  3.4*   CL 92* 97* 95*   CO2 22* 24 26   BUN 38* 44* 36*   CREATININE 2.5* 2.4* 1.3*   CALCIUM 8.5 7.4* 8.3*     Recent Labs     03/26/23  0934   AST 35   ALT 20   BILITOT 0.6   ALKPHOS 88     No results for input(s): INR in the last 72 hours. No results for input(s): Heron Yarones in the last 72 hours. Urinalysis:      Lab Results   Component Value Date/Time    NITRU NEGATIVE 03/26/2023 12:43 AM    WBCUA 5-9 03/26/2023 12:43 AM    BACTERIA NONE SEEN 03/26/2023 12:43 AM    RBCUA 0-2 03/26/2023 12:43 AM    BLOODU MODERATE 03/26/2023 12:43 AM    SPECGRAV 1.026 03/26/2023 12:43 AM       Radiology:  US RENAL COMPLETE   Final Result    No evidence of hydronephrosis. **This report has been created using voice recognition software. It may contain minor errors which are inherent in voice recognition technology. **      Final report electronically signed by Dr. Cecelia Moreno on 3/25/2023 1:46 PM      VL DUP LOWER EXTREMITY VENOUS LEFT   Final Result   No evidence of a DVT in the left lower extremity. **This report has been created using voice recognition software. It may contain minor errors which are inherent in voice recognition technology. **      Final report electronically signed by Dr. Cecelia Moreno on 3/25/2023 11:53 AM      XR TIBIA FIBULA LEFT (2 VIEWS)   Final Result   Soft tissue edema. This document has been electronically signed by: Almaz Meek MD on    03/24/2023 11:27 PM          Diet: ADULT DIET;  Regular    DVT prophylaxis: [x] Lovenox                                 [] SCDs                                 [] SQ Heparin                                 [] Encourage ambulation           [] Already on Anticoagulation Disposition:    [x] Home       [] TCU       [] Rehab       [] Psych       [] SNF       [] [place]       [] Other-    Code Status: Full Code    PT/OT Eval Status: Consulted      Electronically signed by Briseyda Tony DO on 3/26/2023 at 12:35 PM    This note was electronically signed. Parts of this note may have been dictated by use of voice recognition software and electronically transcribed. The note may contain errors not detected in proofreading. Please refer to my supervising physician's documentation if my documentation differs.

## 2023-03-26 NOTE — PROGRESS NOTES
0005: Patient having tachycardia with heart rates between 120's and 130's. Dr. Kenton Espino notified. Orders placed for potassium, increase in fluids, and labs. 0100: Patient having increased pain in left extremity along with his redness spreading up his leg. RN re-outlined the redness of his leg. Rectal temp 101. 3. Heart rate in the 120's. SUSAN Singer, notified, orders placed. 0135: Tylenol given per order. 0140: Bolus given per order. 0200: Char Cox at bedside to assess patient.

## 2023-03-26 NOTE — PLAN OF CARE
Problem: Discharge Planning  Goal: Discharge to home or other facility with appropriate resources  3/26/2023 1015 by Mandi Arreguin RN  Outcome: Progressing  Flowsheets (Taken 3/26/2023 0815)  Discharge to home or other facility with appropriate resources:   Identify barriers to discharge with patient and caregiver   Arrange for needed discharge resources and transportation as appropriate  3/26/2023 0334 by Jose Jacobs RN  Outcome: Progressing  Flowsheets  Taken 3/26/2023 0334  Discharge to home or other facility with appropriate resources:   Identify barriers to discharge with patient and caregiver   Arrange for needed discharge resources and transportation as appropriate   Identify discharge learning needs (meds, wound care, etc)   Arrange for interpreters to assist at discharge as needed  Taken 3/25/2023 2100  Discharge to home or other facility with appropriate resources: Identify barriers to discharge with patient and caregiver     Problem: Pain  Goal: Verbalizes/displays adequate comfort level or baseline comfort level  3/26/2023 1015 by Mandi Arreguin RN  Outcome: Progressing  3/26/2023 0334 by Jose Jacobs RN  Outcome: Progressing  Flowsheets (Taken 3/26/2023 0334)  Verbalizes/displays adequate comfort level or baseline comfort level:   Encourage patient to monitor pain and request assistance   Assess pain using appropriate pain scale     Problem: Safety - Adult  Goal: Free from fall injury  3/26/2023 1015 by Mandi Arreguin RN  Outcome: Progressing  3/26/2023 0334 by Jose Jacobs RN  Outcome: Progressing  Flowsheets (Taken 3/26/2023 0334)  Free From Fall Injury: Instruct family/caregiver on patient safety

## 2023-03-26 NOTE — PLAN OF CARE
factors for pressure ulcer development  Taken 3/25/2023 2100  Incisions, Wounds, or Drain Sites Healing Without Sign and Symptoms of Infection: ADMISSION and DAILY: Assess and document risk factors for pressure ulcer development     Problem: Musculoskeletal - Adult  Goal: Return ADL status to a safe level of function  Outcome: Progressing  Flowsheets  Taken 3/26/2023 0334  Return ADL Status to a Safe Level of Function:   Administer medication as ordered   Assess activities of daily living deficits and provide assistive devices as needed  Taken 3/25/2023 2100  Return ADL Status to a Safe Level of Function: Administer medication as ordered     Problem: Infection - Adult  Goal: Absence of infection at discharge  Outcome: Progressing  Flowsheets  Taken 3/26/2023 0334  Absence of infection at discharge:   Assess and monitor for signs and symptoms of infection   Monitor lab/diagnostic results   Monitor all insertion sites i.e., indwelling lines, tubes and drains  Taken 3/25/2023 2100  Absence of infection at discharge: Assess and monitor for signs and symptoms of infection     Problem: Chronic Conditions and Co-morbidities  Goal: Patient's chronic conditions and co-morbidity symptoms are monitored and maintained or improved  Outcome: Progressing  Flowsheets  Taken 3/26/2023 [place] - Patient's Chronic Conditions and Co-Morbidity Symptoms are Monitored and Maintained or Improved: Monitor and assess patient's chronic conditions and comorbid symptoms for stability, deterioration, or improvement  Taken 3/25/2023 2100  Care Plan - Patient's Chronic Conditions and Co-Morbidity Symptoms are Monitored and Maintained or Improved: Monitor and assess patient's chronic conditions and comorbid symptoms for stability, deterioration, or improvement     Care plan reviewed with patient. Patient verbalizes understanding of plan of care and contributes to goal setting.

## 2023-03-26 NOTE — PROGRESS NOTES
Hepatic:   Recent Labs     03/26/23  0140 03/26/23  0934   LABALBU 3.0* 2.7*   AST  --  35   ALT  --  20   BILITOT  --  0.6   ALKPHOS  --  88       Assessment and Plan:  Renal -acute kidney injury again etiology not clear may be due to sepsis however cannot rule out a component of chronic kidney disease at this time. Overall creatinine getting better with IV fluids  Urine lites show prerenal does have some mild proteinuria  Renal ultrasound scan shows large kidneys  We will continue IV fluids for today but decreased to 80 mm/h  Electrolytes -hyponatremia secondary to renal dysfunction improved with IV fluids  Hypokalemia doing well  Mild metabolic acidosis appears to be improved with improving renal function  Blood pressure does not officially carry a diagnosis of hypertension but blood pressure appears to be running slightly higher start Norvasc 5 mg p.o. daily  Left leg cellulitis on antibiotics appears to be getting better  Hypocalcemia-corrected calcium appears to be reasonable. Having vitamin D deficiency start ergocalciferol  Meds reviewed     Majo Flowers MD  [facility]    This report has been created using voice recognition software.  It may contain minor errors which are inherent in voice recognition technology

## 2023-03-26 NOTE — RT PROTOCOL NOTE
RT Inhaler-Nebulizer Bronchodilator Protocol Note    There is a bronchodilator order in the chart from a provider indicating to follow the RT Bronchodilator Protocol and there is an Initiate RT Inhaler-Nebulizer Bronchodilator Protocol order as well (see protocol at bottom of note). CXR Findings:  No results found. The findings from the last RT Protocol Assessment were as follows:   History Pulmonary Disease: None or smoker <15 pack years  Respiratory Pattern: Dyspnea on exertion or RR 21-25 bpm  Breath Sounds: Slightly diminished and/or crackles  Cough: Strong, spontaneous, non-productive  Indication for Bronchodilator Therapy: Decreased or absent breath sounds  Bronchodilator Assessment Score: 4    Aerosolized bronchodilator medication orders have been revised according to the RT Inhaler-Nebulizer Bronchodilator Protocol below. Respiratory Therapist to perform RT Therapy Protocol Assessment initially then follow the protocol. Repeat RT Therapy Protocol Assessment PRN for score 0-3 or on second treatment, BID, and PRN for scores above 3. No Indications - adjust the frequency to every 6 hours PRN wheezing or bronchospasm, if no treatments needed after 48 hours then discontinue using Per Protocol order mode. If indication present, adjust the RT bronchodilator orders based on the Bronchodilator Assessment Score as indicated below. Use Inhaler orders unless patient has one or more of the following: on home nebulizer, not able to hold breath for 10 seconds, is not alert and oriented, cannot activate and use MDI correctly, or respiratory rate 25 breaths per minute or more, then use the equivalent nebulizer order(s) with same Frequency and PRN reasons based on the score. If a patient is on this medication at home then do not decrease Frequency below that used at home.     0-3 - enter or revise RT bronchodilator order(s) to equivalent RT Bronchodilator order with Frequency of every 4 hours PRN for

## 2023-03-27 ENCOUNTER — APPOINTMENT (OUTPATIENT)
Dept: GENERAL RADIOLOGY | Age: 53
End: 2023-03-27
Payer: COMMERCIAL

## 2023-03-27 LAB
ANION GAP SERPL CALC-SCNC: 12 MEQ/L (ref 8–16)
BACTERIA BLD AEROBE CULT: ABNORMAL
BACTERIA BLD AEROBE CULT: ABNORMAL
BUN SERPL-MCNC: 32 MG/DL (ref 7–22)
CALCIUM SERPL-MCNC: 8.5 MG/DL (ref 8.5–10.5)
CHLORIDE SERPL-SCNC: 95 MEQ/L (ref 98–111)
CO2 SERPL-SCNC: 25 MEQ/L (ref 23–33)
CREAT SERPL-MCNC: 1.1 MG/DL (ref 0.4–1.2)
DEPRECATED RDW RBC AUTO: 47.4 FL (ref 35–45)
ERYTHROCYTE [DISTWIDTH] IN BLOOD BY AUTOMATED COUNT: 14 % (ref 11.5–14.5)
GFR SERPL CREATININE-BSD FRML MDRD: > 60 ML/MIN/1.73M2
GLUCOSE SERPL-MCNC: 202 MG/DL (ref 70–108)
HCT VFR BLD AUTO: 42.4 % (ref 42–52)
HGB BLD-MCNC: 13.4 GM/DL (ref 14–18)
LACTATE SERPL-SCNC: 1.9 MMOL/L (ref 0.5–2)
MCH RBC QN AUTO: 29 PG (ref 26–33)
MCHC RBC AUTO-ENTMCNC: 31.6 GM/DL (ref 32.2–35.5)
MCV RBC AUTO: 91.8 FL (ref 80–94)
ORGANISM: ABNORMAL
ORGANISM: ABNORMAL
PLATELET # BLD AUTO: 217 THOU/MM3 (ref 130–400)
PMV BLD AUTO: 10.2 FL (ref 9.4–12.4)
POTASSIUM SERPL-SCNC: 3.5 MEQ/L (ref 3.5–5.2)
POTASSIUM SERPL-SCNC: 4 MEQ/L (ref 3.5–5.2)
RBC # BLD AUTO: 4.62 MILL/MM3 (ref 4.7–6.1)
SODIUM SERPL-SCNC: 132 MEQ/L (ref 135–145)
WBC # BLD AUTO: 17 THOU/MM3 (ref 4.8–10.8)

## 2023-03-27 PROCEDURE — 6370000000 HC RX 637 (ALT 250 FOR IP)

## 2023-03-27 PROCEDURE — 71045 X-RAY EXAM CHEST 1 VIEW: CPT

## 2023-03-27 PROCEDURE — 2580000003 HC RX 258

## 2023-03-27 PROCEDURE — 97535 SELF CARE MNGMENT TRAINING: CPT

## 2023-03-27 PROCEDURE — 83605 ASSAY OF LACTIC ACID: CPT

## 2023-03-27 PROCEDURE — 6360000002 HC RX W HCPCS: Performed by: PHYSICIAN ASSISTANT

## 2023-03-27 PROCEDURE — 85027 COMPLETE CBC AUTOMATED: CPT

## 2023-03-27 PROCEDURE — 6370000000 HC RX 637 (ALT 250 FOR IP): Performed by: PHYSICIAN ASSISTANT

## 2023-03-27 PROCEDURE — 97166 OT EVAL MOD COMPLEX 45 MIN: CPT

## 2023-03-27 PROCEDURE — 1200000003 HC TELEMETRY R&B

## 2023-03-27 PROCEDURE — 99232 SBSQ HOSP IP/OBS MODERATE 35: CPT | Performed by: INTERNAL MEDICINE

## 2023-03-27 PROCEDURE — 94640 AIRWAY INHALATION TREATMENT: CPT

## 2023-03-27 PROCEDURE — 87040 BLOOD CULTURE FOR BACTERIA: CPT

## 2023-03-27 PROCEDURE — 6370000000 HC RX 637 (ALT 250 FOR IP): Performed by: INTERNAL MEDICINE

## 2023-03-27 PROCEDURE — 36415 COLL VENOUS BLD VENIPUNCTURE: CPT

## 2023-03-27 PROCEDURE — 97530 THERAPEUTIC ACTIVITIES: CPT

## 2023-03-27 PROCEDURE — 2580000003 HC RX 258: Performed by: PHYSICIAN ASSISTANT

## 2023-03-27 PROCEDURE — 80048 BASIC METABOLIC PNL TOTAL CA: CPT

## 2023-03-27 PROCEDURE — 84132 ASSAY OF SERUM POTASSIUM: CPT

## 2023-03-27 PROCEDURE — 1200000000 HC SEMI PRIVATE

## 2023-03-27 PROCEDURE — 6360000002 HC RX W HCPCS

## 2023-03-27 RX ORDER — POTASSIUM CHLORIDE 7.45 MG/ML
10 INJECTION INTRAVENOUS PRN
Status: DISCONTINUED | OUTPATIENT
Start: 2023-03-27 | End: 2023-04-01 | Stop reason: HOSPADM

## 2023-03-27 RX ORDER — POTASSIUM CHLORIDE 20 MEQ/1
40 TABLET, EXTENDED RELEASE ORAL PRN
Status: DISCONTINUED | OUTPATIENT
Start: 2023-03-27 | End: 2023-04-01 | Stop reason: HOSPADM

## 2023-03-27 RX ORDER — DIPHENHYDRAMINE HCL 25 MG
25 TABLET ORAL NIGHTLY
Status: COMPLETED | OUTPATIENT
Start: 2023-03-27 | End: 2023-03-29

## 2023-03-27 RX ORDER — PREDNISONE 20 MG/1
20 TABLET ORAL DAILY
Status: COMPLETED | OUTPATIENT
Start: 2023-03-27 | End: 2023-03-29

## 2023-03-27 RX ORDER — ALBUTEROL SULFATE 90 UG/1
2 AEROSOL, METERED RESPIRATORY (INHALATION) EVERY 4 HOURS PRN
Status: DISCONTINUED | OUTPATIENT
Start: 2023-03-27 | End: 2023-03-28

## 2023-03-27 RX ORDER — POTASSIUM CHLORIDE 20 MEQ/1
20 TABLET, EXTENDED RELEASE ORAL 2 TIMES DAILY WITH MEALS
Status: COMPLETED | OUTPATIENT
Start: 2023-03-27 | End: 2023-03-27

## 2023-03-27 RX ORDER — FUROSEMIDE 10 MG/ML
20 INJECTION INTRAMUSCULAR; INTRAVENOUS ONCE
Status: COMPLETED | OUTPATIENT
Start: 2023-03-27 | End: 2023-03-27

## 2023-03-27 RX ADMIN — CEFTRIAXONE SODIUM 2000 MG: 2 INJECTION, POWDER, FOR SOLUTION INTRAMUSCULAR; INTRAVENOUS at 17:13

## 2023-03-27 RX ADMIN — POTASSIUM CHLORIDE 20 MEQ: 1500 TABLET, EXTENDED RELEASE ORAL at 11:55

## 2023-03-27 RX ADMIN — AMLODIPINE BESYLATE 2.5 MG: 2.5 TABLET ORAL at 09:04

## 2023-03-27 RX ADMIN — PREDNISONE 20 MG: 20 TABLET ORAL at 16:32

## 2023-03-27 RX ADMIN — ALBUTEROL SULFATE 2.5 MG: 2.5 SOLUTION RESPIRATORY (INHALATION) at 12:33

## 2023-03-27 RX ADMIN — CETIRIZINE HYDROCHLORIDE 10 MG: 10 TABLET, FILM COATED ORAL at 02:11

## 2023-03-27 RX ADMIN — SODIUM CHLORIDE, PRESERVATIVE FREE 10 ML: 5 INJECTION INTRAVENOUS at 09:04

## 2023-03-27 RX ADMIN — ENOXAPARIN SODIUM 30 MG: 100 INJECTION SUBCUTANEOUS at 02:10

## 2023-03-27 RX ADMIN — CETIRIZINE HYDROCHLORIDE 10 MG: 10 TABLET, FILM COATED ORAL at 09:04

## 2023-03-27 RX ADMIN — SODIUM CHLORIDE: 9 INJECTION, SOLUTION INTRAVENOUS at 13:26

## 2023-03-27 RX ADMIN — IPRATROPIUM BROMIDE AND ALBUTEROL SULFATE 1 AMPULE: .5; 3 SOLUTION RESPIRATORY (INHALATION) at 09:04

## 2023-03-27 RX ADMIN — ENOXAPARIN SODIUM 30 MG: 100 INJECTION SUBCUTANEOUS at 13:31

## 2023-03-27 RX ADMIN — IPRATROPIUM BROMIDE 0.5 MG: 0.5 SOLUTION RESPIRATORY (INHALATION) at 19:57

## 2023-03-27 RX ADMIN — DIPHENHYDRAMINE HYDROCHLORIDE 25 MG: 25 TABLET ORAL at 21:07

## 2023-03-27 RX ADMIN — IPRATROPIUM BROMIDE 0.5 MG: 0.5 SOLUTION RESPIRATORY (INHALATION) at 12:33

## 2023-03-27 RX ADMIN — POTASSIUM CHLORIDE 20 MEQ: 1500 TABLET, EXTENDED RELEASE ORAL at 17:55

## 2023-03-27 RX ADMIN — POTASSIUM CHLORIDE 40 MEQ: 1500 TABLET, EXTENDED RELEASE ORAL at 13:31

## 2023-03-27 RX ADMIN — ALBUTEROL SULFATE 2.5 MG: 2.5 SOLUTION RESPIRATORY (INHALATION) at 19:57

## 2023-03-27 RX ADMIN — SODIUM CHLORIDE, PRESERVATIVE FREE 10 ML: 5 INJECTION INTRAVENOUS at 21:07

## 2023-03-27 RX ADMIN — ACETAMINOPHEN 650 MG: 325 TABLET ORAL at 15:15

## 2023-03-27 RX ADMIN — AMPICILLIN SODIUM 2000 MG: 2 INJECTION, POWDER, FOR SOLUTION INTRAVENOUS at 13:47

## 2023-03-27 RX ADMIN — FUROSEMIDE 20 MG: 10 INJECTION, SOLUTION INTRAMUSCULAR; INTRAVENOUS at 10:38

## 2023-03-27 ASSESSMENT — PAIN DESCRIPTION - DESCRIPTORS
DESCRIPTORS: ACHING
DESCRIPTORS: ACHING

## 2023-03-27 ASSESSMENT — PAIN DESCRIPTION - LOCATION
LOCATION: LEG
LOCATION: LEG

## 2023-03-27 ASSESSMENT — PAIN DESCRIPTION - ORIENTATION
ORIENTATION: LEFT
ORIENTATION: LEFT

## 2023-03-27 ASSESSMENT — PAIN SCALES - GENERAL
PAINLEVEL_OUTOF10: 4
PAINLEVEL_OUTOF10: 5
PAINLEVEL_OUTOF10: 5

## 2023-03-27 ASSESSMENT — PAIN - FUNCTIONAL ASSESSMENT: PAIN_FUNCTIONAL_ASSESSMENT: PREVENTS OR INTERFERES SOME ACTIVE ACTIVITIES AND ADLS

## 2023-03-27 NOTE — PROGRESS NOTES
[facility]   Nephrology progress note  3/27/2023, 11:03 AM      Pt Name:    Ce Berry  MRN:     [id number]     YOB: 1970  Admit Date:    3/24/2023 10:23 PM    Chief Complaint: Nephrology following for SHOLA/CKD. Subjective:  Patient seen and examined  No chest pain shortness of breath resting comfortably  . Bedside brother at bedside    Objective:  24HR INTAKE/OUTPUT:    Intake/Output Summary (Last 24 hours) at 3/27/2023 1103  Last data filed at 3/27/2023 1041  Gross per 24 hour   Intake 3072.71 ml   Output 2025 ml   Net 1047.71 ml        Admission weight: (!) 315 lb (142.9 kg)    Wt Readings from Last 3 Encounters:   03/27/23 (!) 327 lb 6.1 oz (148.5 kg)        Vitals :   Vitals:    03/27/23 0024 03/27/23 0448 03/27/23 0531 03/27/23 0845   BP: (!) 159/79 125/82  (!) 151/85   Pulse: (!) 104 96  90   Resp: 28 20  20   Temp: 98.1 °F (36.7 °C) 97.7 °F (36.5 °C)  98.8 °F (37.1 °C)   TempSrc: Oral Oral  Oral   SpO2: 92% 92%  93%   Weight:   (!) 327 lb 6.1 oz (148.5 kg)    Height:           Physical examination  General Appearance:  Well developed.  No distress  Mouth/Throat:  Oral mucosa moist  Neck:  Supple, no JVD  Lungs:  Breath sounds: clear  Heart[de-identified]  S1,S2 heard  Abdomen:  Soft, non - tender  Musculoskeletal:  Edema and erythema-noted on the left leg getting better    Medications:  Infusion:    sodium chloride      sodium chloride 125 mL/hr at 03/26/23 2023     Meds:    albuterol  2.5 mg Nebulization TID    ipratropium  0.5 mg Nebulization TID    ampicillin IV  2,000 mg IntraVENous Q6H    potassium replacement protocol   Other RX Placeholder    [Held by provider] clindamycin (CLEOCIN) IV  900 mg IntraVENous Q8H    amLODIPine  2.5 mg Oral Daily    vitamin D  50,000 Units Oral Weekly    cetirizine  10 mg Oral Daily    sodium chloride flush  5-40 mL IntraVENous 2 times per day    enoxaparin  30 mg SubCUTAneous Q12H    calcium replacement protocol   Other RX Placeholder       Lab

## 2023-03-27 NOTE — PLAN OF CARE
Problem: Discharge Planning  Goal: Discharge to home or other facility with appropriate resources  Outcome: Progressing  Flowsheets (Taken 3/27/2023 0868)  Discharge to home or other facility with appropriate resources:   Identify barriers to discharge with patient and caregiver   Arrange for needed discharge resources and transportation as appropriate   Identify discharge learning needs (meds, wound care, etc)   Refer to discharge planning if patient needs post-hospital services based on physician order or complex needs related to functional status, cognitive ability or social support system     Problem: Pain  Goal: Verbalizes/displays adequate comfort level or baseline comfort level  Outcome: Progressing     Problem: Safety - Adult  Goal: Free from fall injury  Outcome: Progressing     Problem: Respiratory - Adult  Goal: Clear lung sounds  3/27/2023 0916 by Hayde Guevara RCP  Outcome: Progressing

## 2023-03-27 NOTE — PLAN OF CARE
Problem: Discharge Planning  Goal: Discharge to home or other facility with appropriate resources  3/26/2023 2330 by Satinder Oneill RN  Outcome: Progressing  Flowsheets (Taken 3/26/2023 2028)  Discharge to home or other facility with appropriate resources: Identify barriers to discharge with patient and caregiver  3/26/2023 1015 by Tino Rogers RN  Outcome: Progressing  Flowsheets (Taken 3/26/2023 0815)  Discharge to home or other facility with appropriate resources:   Identify barriers to discharge with patient and caregiver   Arrange for needed discharge resources and transportation as appropriate     Problem: Pain  Goal: Verbalizes/displays adequate comfort level or baseline comfort level  3/26/2023 2330 by Satinder Oneill RN  Outcome: Progressing  Flowsheets (Taken 3/26/2023 1950)  Verbalizes/displays adequate comfort level or baseline comfort level:   Encourage patient to monitor pain and request assistance   Assess pain using appropriate pain scale  3/26/2023 1015 by Tino Rogers RN  Outcome: Progressing     Problem: Safety - Adult  Goal: Free from fall injury  3/26/2023 2330 by Satinder Oneill RN  Outcome: Progressing  3/26/2023 1015 by Tino Rogers RN  Outcome: Progressing     Problem: Skin/Tissue Integrity  Goal: Absence of new skin breakdown  Description: 1. Monitor for areas of redness and/or skin breakdown  2. Assess vascular access sites hourly  3. Every 4-6 hours minimum:  Change oxygen saturation probe site  4. Every 4-6 hours:  If on nasal continuous positive airway pressure, respiratory therapy assess nares and determine need for appliance change or resting period.   3/26/2023 2330 by Satinder Oneill RN  Outcome: Progressing  3/26/2023 1015 by Tino Rogers RN  Outcome: Progressing     Problem: Skin/Tissue Integrity - Adult  Goal: Incisions, wounds, or drain sites healing without S/S of infection  3/26/2023 2330 by Satinder Oneill RN  Outcome: Progressing  Flowsheets (Taken 3/26/2023 0815)  Care Plan - Patient's Chronic Conditions and Co-Morbidity Symptoms are Monitored and Maintained or Improved:   Monitor and assess patient's chronic conditions and comorbid symptoms for stability, deterioration, or improvement   Update acute care plan with appropriate goals if chronic or comorbid symptoms are exacerbated and prevent overall improvement and discharge   Collaborate with multidisciplinary team to address chronic and comorbid conditions and prevent exacerbation or deterioration     Problem: Respiratory - Adult  Goal: Clear lung sounds  3/26/2023 1915 by Viet Castillo RCP  Outcome: Progressing   Care plan reviewed with pt.

## 2023-03-27 NOTE — PROGRESS NOTES
[facility] 62  INPATIENT OCCUPATIONAL THERAPY  STRZ RENAL TELEMETRY 6K  EVALUATION    Time:   Time In: 4646  Time Out: 1505  Timed Code Treatment Minutes: 38 Minutes  Minutes: 45          Date: 3/27/2023  Patient Name: Janet Yi,   Gender: male      MRN: [id number]  : 1970  (50 y.o.)  Referring Practitioner: Mira Pickard DO  Diagnosis: Septicemia  Additional Pertinent Hx: per H&P, \"Patient presents to the ED with left lower extremity swelling and redness for the past 3 days. The patient denies any recent injury, wounds, or rash on the lower extremity. The patient has no significant medical history including no history of DVT. There are no fevers, chills, or shortness of breath. Patient is admitted for left lower extremity cellulitis and DVT r/o. \"    Restrictions/Precautions:  Restrictions/Precautions: General Precautions, Fall Risk  Position Activity Restriction  Other position/activity restrictions: LLE Cellulitis    Subjective  Chart Reviewed: Yes, Orders, Progress Notes, History and Physical  Patient assessed for rehabilitation services?: Yes    Subjective: RN okayed OT session. Upon arrival patient was resting in bed. Pt was agreeable to OT session with encouragement. Pain: 5/10: Leg pain    Vitals: Vitals not assessed per clinical judgement, see nursing flowsheet    Social/Functional History:  Lives With: Spouse  Type of Home: House  Home Layout: One level  Home Access: Ramped entrance   Bathroom Shower/Tub: Tub/Shower unit  Bathroom Toilet: Standard  Bathroom Accessibility: Accessible       ADL Assistance: Independent  Homemaking Assistance: Needs assistance (wife completes)  Ambulation Assistance: Independent  Transfer Assistance: Independent    Active : Yes  Occupation: Full time employment  Type of Occupation: Maintance  Additional Comments: d/t [place] unsure of accuracy of information.     VISION:WFL    HEARING:   [place]    COGNITION: Slow Processing, Decreased Problem min vcs for technique to increase indep and endurance with all self cares. Short Term Goal 2: Pt will complete standing tolerance x 5 minutes with 2 UE release and S to increase indep and endurance with sinkside grooming. Short Term Goal 3: Pt will complete functional mobility to/from BR and HH distances with S to increase indep and endurance with all toileting. Short Term Goal 4: Pt will complete LB dressing with LHAE PRN and min A to increase indep and endurance within home environment. Short Term Goal 5: Pt will complete toileting task including hygiene with SBA to increase indep in home environment. Additional Goals?: No         Following session, patient left in safe position with all fall risk precautions in place.

## 2023-03-27 NOTE — PROGRESS NOTES
Physician Progress Note      PATIENTMichael Alves  CSN #:                  [id number]  :                       1970  ADMIT DATE:       3/24/2023 10:23 PM  [address] [place] DATE:  RESPONDING  PROVIDER #: Isela Holloway MD          QUERY TEXT:    Pt admitted with Left lower extremity cellulitis 3-24. Empiric Ancef started   in the ED, will add Vancomycin pharmacy to dose  and Lactic acidosis. Documented Sepsis by ED physician, and documented 3-25 by   internal med resident: Bacteremia/ SIRS criteria. then 3-26, Li Bates DO resident  documented \"Sepsis with bacteremia 2/2 cellulitis of left   lower extremity. \"  If possible, please document in progress notes and   discharge summary the present on admission status of Sepsis:    The medical record reflects the following:  Risk Factors: Overnight 3/26 patient remained tachycardic and developed fever   of 101.3, Cleocin started overnight and given fluid bolus another 1 L  Clinical Indicators: Sepsis Criteria: WBC 13.5, LA 3.2, tachycardia   104-124/min, tachypnea 28/min, temp 101.3  Treatment: IV fluids and IV ATBS    Thank you. Aria Nix RN, Clinical Documentation Integrity, [employer], [facility]), CRCR  Options provided:  -- Sepsis confirmed after study and was present on admission  -- Sepsis, not present on admission  -- Other - I will add my own diagnosis  -- Disagree - Not applicable / Not valid  -- Disagree - Clinically unable to determine / Unknown  -- Refer to Clinical Documentation Reviewer    PROVIDER RESPONSE TEXT:    This patient has sepsis that was not present on admission. Query created by: Gin Knight on 3/27/2023 9:16 AM      QUERY TEXT:    Patient admitted with BMI 48.35. If possible, please document in progress   notes and discharge summary if you are evaluating and /or treating any of the   following:     The medical record reflects the following:  Risk Factors: cellulitis  Clinical Indicators:

## 2023-03-27 NOTE — PROGRESS NOTES
PROGRESS NOTE      Patient:  Víctor Median  Unit/Bed:6K-23/023-A  YOB: 1970  MRN: [id number]   Acct: [de-identified]    PCP: Brittany Talavera DO    Date of Admission: 3/24/2023 LOS: 3    Date of Evaluation:  3/27/2023    Anticipated Discharge: Pending clinical course    Assessment/Plan:    Sepsis with bacteremia 2/2 cellulitis of the left lower extremity  S/p 3L IV fluid bolus on 3/25/2023  [employer] with strep pyogenes; blood cultures-3/23 growing strep pyogenes sensitive to penicillin G, vancomycin and Rocephin  Elevated WBCs at 12.4 on admission with tachycardia; leukocytosis worsened at 17 3/27  Lactic acidosis 2.0 3/21, improved to 1.9 3/27  3/24 started on Ancef and vancomycin, changed to ampicillin 3/25 but patient remained tachycardic and increasingly febrile with Tmax 101.3 so Cleocin started 3/26 --> patient had episode of tachypnea and developed rash on chest and abdomen --> antibiotics discontinued with improvement s/p Benadryl and prednisone --> restarted ampicillin 3/27 and will continue to monitor for symptoms  Repeat blood cultures pending; will adjust antibiotics as needed  Cellulitis left lower extremi  Improving; area of warmth, erythema and edema decreasing  Duplex US BL LEs 3/25/2023 negative for DVT  X-ray LLE 3/23/2023 positive for soft tissue edema no signs of subcu gas or bony abnormality  Continue ampicillin  Drug reaction  Patient had episode of tachypnea associated with erythematous rash on chest and abdomen 3/20 6 in the evening --> antibiotics discontinued overnight and patient given Benadryl and Solu-Medrol  Ampicillin restarted this morning; will continue to monitor for symptoms and give prednisone 20 mg daily x3 days and Benadryl nightly x3 days  SHOLA  Likely prerenal 2/2 poor p.o. intake, dehydration, sepsis  CRE 2.4 on admission --> improved to 1.1 3/27/2023  Nephrology consulted 3/25, pantera recs  Renal ultrasound 3/25/2023 negative for hydronephrosis  Continue IV rales.   Abdominal:      General: Abdomen is flat. Bowel sounds are normal. There is no distension. Palpations: Abdomen is soft. Tenderness: There is no abdominal tenderness. There is no right CVA tenderness, left CVA tenderness or guarding. Musculoskeletal:         General: Swelling (in LLE) present. Normal range of motion. Cervical back: Normal range of motion. Right lower leg: Edema present. Left lower leg: Edema present. Skin:     General: Skin is warm and dry. Findings: Rash (erythema from neck to chest and area of patchy erythema over LLE area of cellulitis) present. Neurological:      General: No focal deficit present. Mental Status: He is alert and oriented to person, place, and time. Motor: No weakness. Gait: Gait normal.      Deep Tendon Reflexes: Reflexes normal.      All labs reviewed and interpreted by me:  Labs:   Recent Labs     03/24/23 2238 03/25/23 0439 03/26/23  1809   WBC 13.5* 12.4* 12.5*   HGB 15.6 13.9* 14.1   HCT 46.9 43.0 41.9*    142 166     Recent Labs     03/24/23 2238 03/25/23 0439 03/26/23  0934   * 135 132*   K 3.3* 3.3* 3.6  3.4*   CL 92* 97* 95*   CO2 22* 24 26   BUN 38* 44* 36*   CREATININE 2.5* 2.4* 1.3*   CALCIUM 8.5 7.4* 8.3*     Recent Labs     03/26/23  0934   AST 35   ALT 20   BILITOT 0.6   ALKPHOS 88     No results for input(s): INR in the last 72 hours. No results for input(s): [place] Ill in the last 72 hours. Urinalysis:      Lab Results   Component Value Date/Time    NITRU NEGATIVE 03/26/2023 12:43 AM    WBCUA 5-9 03/26/2023 12:43 AM    BACTERIA NONE SEEN 03/26/2023 12:43 AM    RBCUA 0-2 03/26/2023 12:43 AM    BLOODU MODERATE 03/26/2023 12:43 AM    SPECGRAV 1.026 03/26/2023 12:43 AM       All radiology images and reports reviewed and interpreted by me:  Radiology:  US RENAL COMPLETE   Final Result    No evidence of hydronephrosis.                **This report has been created using voice

## 2023-03-27 NOTE — RT PROTOCOL NOTE
RT Inhaler-Nebulizer Bronchodilator Protocol Note    There is a bronchodilator order in the chart from a provider indicating to follow the RT Bronchodilator Protocol and there is an Initiate RT Inhaler-Nebulizer Bronchodilator Protocol order as well (see protocol at bottom of note). CXR Findings:  No results found. The findings from the last RT Protocol Assessment were as follows:   History Pulmonary Disease: None or smoker <15 pack years  Respiratory Pattern: Dyspnea on exertion or RR 21-25 bpm  Breath Sounds: Inspiratory and expiratory or bilateral wheezing and/or rhonchi  Cough: Strong, spontaneous, non-productive  Indication for Bronchodilator Therapy: Decreased or absent breath sounds  Bronchodilator Assessment Score: 8    Aerosolized bronchodilator medication orders have been revised according to the RT Inhaler-Nebulizer Bronchodilator Protocol below. Respiratory Therapist to perform RT Therapy Protocol Assessment initially then follow the protocol. Repeat RT Therapy Protocol Assessment PRN for score 0-3 or on second treatment, BID, and PRN for scores above 3. No Indications - adjust the frequency to every 6 hours PRN wheezing or bronchospasm, if no treatments needed after 48 hours then discontinue using Per Protocol order mode. If indication present, adjust the RT bronchodilator orders based on the Bronchodilator Assessment Score as indicated below. Use Inhaler orders unless patient has one or more of the following: on home nebulizer, not able to hold breath for 10 seconds, is not alert and oriented, cannot activate and use MDI correctly, or respiratory rate 25 breaths per minute or more, then use the equivalent nebulizer order(s) with same Frequency and PRN reasons based on the score. If a patient is on this medication at home then do not decrease Frequency below that used at home.     0-3 - enter or revise RT bronchodilator order(s) to equivalent RT Bronchodilator order with Frequency of every 4 hours PRN for wheezing or increased work of breathing using Per Protocol order mode. 4-6 - enter or revise RT Bronchodilator order(s) to two equivalent RT bronchodilator orders with one order with BID Frequency and one order with Frequency of every 4 hours PRN wheezing or increased work of breathing using Per Protocol order mode. 7-10 - enter or revise RT Bronchodilator order(s) to two equivalent RT bronchodilator orders with one order with TID Frequency and one order with Frequency of every 4 hours PRN wheezing or increased work of breathing using Per Protocol order mode. 11-13 - enter or revise RT Bronchodilator order(s) to one equivalent RT bronchodilator order with QID Frequency and an Albuterol order with Frequency of every 4 hours PRN wheezing or increased work of breathing using Per Protocol order mode. Greater than 13 - enter or revise RT Bronchodilator order(s) to one equivalent RT bronchodilator order with every 4 hours Frequency and an Albuterol order with Frequency of every 2 hours PRN wheezing or increased work of breathing using Per Protocol order mode. RT to enter RT Home Evaluation for COPD & MDI Assessment order using Per Protocol order mode.     Electronically signed by Lois Polanco RCP on 3/27/2023 at 9:09 AM

## 2023-03-28 ENCOUNTER — APPOINTMENT (OUTPATIENT)
Dept: INTERVENTIONAL RADIOLOGY/VASCULAR | Age: 53
End: 2023-03-28
Payer: COMMERCIAL

## 2023-03-28 LAB
ANION GAP SERPL CALC-SCNC: 10 MEQ/L (ref 8–16)
BASOPHILS ABSOLUTE: 0.1 THOU/MM3 (ref 0–0.1)
BASOPHILS NFR BLD AUTO: 0.4 %
BUN SERPL-MCNC: 35 MG/DL (ref 7–22)
CALCIUM SERPL-MCNC: 8.2 MG/DL (ref 8.5–10.5)
CHLORIDE SERPL-SCNC: 99 MEQ/L (ref 98–111)
CO2 SERPL-SCNC: 28 MEQ/L (ref 23–33)
CREAT SERPL-MCNC: 1.1 MG/DL (ref 0.4–1.2)
DEPRECATED RDW RBC AUTO: 44.9 FL (ref 35–45)
EOSINOPHIL NFR BLD AUTO: 0 %
EOSINOPHILS ABSOLUTE: 0 THOU/MM3 (ref 0–0.4)
ERYTHROCYTE [DISTWIDTH] IN BLOOD BY AUTOMATED COUNT: 14.1 % (ref 11.5–14.5)
GFR SERPL CREATININE-BSD FRML MDRD: > 60 ML/MIN/1.73M2
GLUCOSE BLD STRIP.AUTO-MCNC: 155 MG/DL (ref 70–108)
GLUCOSE BLD STRIP.AUTO-MCNC: 165 MG/DL (ref 70–108)
GLUCOSE BLD STRIP.AUTO-MCNC: 173 MG/DL (ref 70–108)
GLUCOSE BLD STRIP.AUTO-MCNC: 193 MG/DL (ref 70–108)
GLUCOSE SERPL-MCNC: 184 MG/DL (ref 70–108)
HCT VFR BLD AUTO: 39.1 % (ref 42–52)
HGB BLD-MCNC: 13.4 GM/DL (ref 14–18)
IMM GRANULOCYTES # BLD AUTO: 0.5 THOU/MM3 (ref 0–0.07)
IMM GRANULOCYTES NFR BLD AUTO: 2.4 %
LV EF: 53 %
LVEF MODALITY: NORMAL
LYMPHOCYTES ABSOLUTE: 1.2 THOU/MM3 (ref 1–4.8)
LYMPHOCYTES NFR BLD AUTO: 5.7 %
MAGNESIUM SERPL-MCNC: 2.4 MG/DL (ref 1.6–2.4)
MCH RBC QN AUTO: 29.7 PG (ref 26–33)
MCHC RBC AUTO-ENTMCNC: 34.3 GM/DL (ref 32.2–35.5)
MCV RBC AUTO: 86.7 FL (ref 80–94)
MONOCYTES ABSOLUTE: 1.1 THOU/MM3 (ref 0.4–1.3)
MONOCYTES NFR BLD AUTO: 5.1 %
NEUTROPHILS NFR BLD AUTO: 86.4 %
NRBC BLD AUTO-RTO: 0 /100 WBC
PLATELET # BLD AUTO: 302 THOU/MM3 (ref 130–400)
PMV BLD AUTO: 9.9 FL (ref 9.4–12.4)
POTASSIUM SERPL-SCNC: 3.4 MEQ/L (ref 3.5–5.2)
RBC # BLD AUTO: 4.51 MILL/MM3 (ref 4.7–6.1)
SEGMENTED NEUTROPHILS ABSOLUTE COUNT: 17.8 THOU/MM3 (ref 1.8–7.7)
SODIUM SERPL-SCNC: 137 MEQ/L (ref 135–145)
WBC # BLD AUTO: 20.6 THOU/MM3 (ref 4.8–10.8)

## 2023-03-28 PROCEDURE — 94760 N-INVAS EAR/PLS OXIMETRY 1: CPT

## 2023-03-28 PROCEDURE — 6360000002 HC RX W HCPCS: Performed by: INTERNAL MEDICINE

## 2023-03-28 PROCEDURE — 2580000003 HC RX 258: Performed by: PHYSICIAN ASSISTANT

## 2023-03-28 PROCEDURE — 6370000000 HC RX 637 (ALT 250 FOR IP): Performed by: PHYSICIAN ASSISTANT

## 2023-03-28 PROCEDURE — 6360000002 HC RX W HCPCS

## 2023-03-28 PROCEDURE — 82948 REAGENT STRIP/BLOOD GLUCOSE: CPT

## 2023-03-28 PROCEDURE — 93926 LOWER EXTREMITY STUDY: CPT

## 2023-03-28 PROCEDURE — 6370000000 HC RX 637 (ALT 250 FOR IP)

## 2023-03-28 PROCEDURE — 36415 COLL VENOUS BLD VENIPUNCTURE: CPT

## 2023-03-28 PROCEDURE — 83735 ASSAY OF MAGNESIUM: CPT

## 2023-03-28 PROCEDURE — 6370000000 HC RX 637 (ALT 250 FOR IP): Performed by: INTERNAL MEDICINE

## 2023-03-28 PROCEDURE — 93306 TTE W/DOPPLER COMPLETE: CPT

## 2023-03-28 PROCEDURE — 94640 AIRWAY INHALATION TREATMENT: CPT

## 2023-03-28 PROCEDURE — 85025 COMPLETE CBC W/AUTO DIFF WBC: CPT

## 2023-03-28 PROCEDURE — 99232 SBSQ HOSP IP/OBS MODERATE 35: CPT | Performed by: INTERNAL MEDICINE

## 2023-03-28 PROCEDURE — 6360000002 HC RX W HCPCS: Performed by: PHYSICIAN ASSISTANT

## 2023-03-28 PROCEDURE — 1200000000 HC SEMI PRIVATE

## 2023-03-28 PROCEDURE — 2580000003 HC RX 258

## 2023-03-28 PROCEDURE — 97110 THERAPEUTIC EXERCISES: CPT

## 2023-03-28 PROCEDURE — 1200000003 HC TELEMETRY R&B

## 2023-03-28 PROCEDURE — 80048 BASIC METABOLIC PNL TOTAL CA: CPT

## 2023-03-28 RX ORDER — INSULIN LISPRO 100 [IU]/ML
0-4 INJECTION, SOLUTION INTRAVENOUS; SUBCUTANEOUS
Status: DISCONTINUED | OUTPATIENT
Start: 2023-03-28 | End: 2023-04-01 | Stop reason: HOSPADM

## 2023-03-28 RX ORDER — DEXTROSE MONOHYDRATE 100 MG/ML
INJECTION, SOLUTION INTRAVENOUS CONTINUOUS PRN
Status: DISCONTINUED | OUTPATIENT
Start: 2023-03-28 | End: 2023-04-01 | Stop reason: HOSPADM

## 2023-03-28 RX ORDER — INSULIN LISPRO 100 [IU]/ML
0-4 INJECTION, SOLUTION INTRAVENOUS; SUBCUTANEOUS NIGHTLY
Status: DISCONTINUED | OUTPATIENT
Start: 2023-03-28 | End: 2023-04-01 | Stop reason: HOSPADM

## 2023-03-28 RX ORDER — ALBUTEROL SULFATE 90 UG/1
2 AEROSOL, METERED RESPIRATORY (INHALATION) EVERY 6 HOURS PRN
Status: DISCONTINUED | OUTPATIENT
Start: 2023-03-28 | End: 2023-03-28

## 2023-03-28 RX ORDER — ALBUTEROL SULFATE 90 UG/1
2 AEROSOL, METERED RESPIRATORY (INHALATION) 2 TIMES DAILY
Status: DISCONTINUED | OUTPATIENT
Start: 2023-03-28 | End: 2023-03-28

## 2023-03-28 RX ORDER — POTASSIUM CHLORIDE 20 MEQ/1
20 TABLET, EXTENDED RELEASE ORAL
Status: DISCONTINUED | OUTPATIENT
Start: 2023-03-28 | End: 2023-03-29 | Stop reason: SDUPTHER

## 2023-03-28 RX ORDER — ALBUTEROL SULFATE 90 UG/1
2 AEROSOL, METERED RESPIRATORY (INHALATION) EVERY 4 HOURS PRN
Status: DISCONTINUED | OUTPATIENT
Start: 2023-03-28 | End: 2023-04-01 | Stop reason: HOSPADM

## 2023-03-28 RX ADMIN — AMLODIPINE BESYLATE 2.5 MG: 2.5 TABLET ORAL at 09:40

## 2023-03-28 RX ADMIN — CETIRIZINE HYDROCHLORIDE 10 MG: 10 TABLET, FILM COATED ORAL at 09:40

## 2023-03-28 RX ADMIN — SODIUM CHLORIDE, PRESERVATIVE FREE 10 ML: 5 INJECTION INTRAVENOUS at 09:40

## 2023-03-28 RX ADMIN — HYDROMORPHONE HYDROCHLORIDE 0.25 MG: 1 INJECTION, SOLUTION INTRAMUSCULAR; INTRAVENOUS; SUBCUTANEOUS at 21:08

## 2023-03-28 RX ADMIN — DIPHENHYDRAMINE HYDROCHLORIDE 25 MG: 25 TABLET ORAL at 21:00

## 2023-03-28 RX ADMIN — ENOXAPARIN SODIUM 30 MG: 100 INJECTION SUBCUTANEOUS at 16:09

## 2023-03-28 RX ADMIN — IPRATROPIUM BROMIDE 2 PUFF: 17 AEROSOL, METERED RESPIRATORY (INHALATION) at 08:07

## 2023-03-28 RX ADMIN — CEFTRIAXONE SODIUM 2000 MG: 2 INJECTION, POWDER, FOR SOLUTION INTRAMUSCULAR; INTRAVENOUS at 19:06

## 2023-03-28 RX ADMIN — POTASSIUM CHLORIDE 20 MEQ: 1500 TABLET, EXTENDED RELEASE ORAL at 16:09

## 2023-03-28 RX ADMIN — ALBUTEROL SULFATE 2 PUFF: 90 AEROSOL, METERED RESPIRATORY (INHALATION) at 08:08

## 2023-03-28 RX ADMIN — SODIUM CHLORIDE, PRESERVATIVE FREE 10 ML: 5 INJECTION INTRAVENOUS at 21:01

## 2023-03-28 RX ADMIN — ENOXAPARIN SODIUM 30 MG: 100 INJECTION SUBCUTANEOUS at 04:09

## 2023-03-28 RX ADMIN — HYDROMORPHONE HYDROCHLORIDE 0.25 MG: 1 INJECTION, SOLUTION INTRAMUSCULAR; INTRAVENOUS; SUBCUTANEOUS at 16:50

## 2023-03-28 RX ADMIN — ACETAMINOPHEN 650 MG: 325 TABLET ORAL at 09:44

## 2023-03-28 RX ADMIN — PREDNISONE 20 MG: 20 TABLET ORAL at 09:40

## 2023-03-28 ASSESSMENT — PAIN DESCRIPTION - ORIENTATION
ORIENTATION: LEFT

## 2023-03-28 ASSESSMENT — PAIN DESCRIPTION - PAIN TYPE: TYPE: ACUTE PAIN

## 2023-03-28 ASSESSMENT — PAIN SCALES - GENERAL
PAINLEVEL_OUTOF10: 6
PAINLEVEL_OUTOF10: 8
PAINLEVEL_OUTOF10: 6
PAINLEVEL_OUTOF10: 4
PAINLEVEL_OUTOF10: 8
PAINLEVEL_OUTOF10: 0
PAINLEVEL_OUTOF10: 8

## 2023-03-28 ASSESSMENT — PAIN DESCRIPTION - LOCATION
LOCATION: FOOT;LEG
LOCATION: LEG
LOCATION: LEG

## 2023-03-28 ASSESSMENT — PAIN - FUNCTIONAL ASSESSMENT
PAIN_FUNCTIONAL_ASSESSMENT: ACTIVITIES ARE NOT PREVENTED
PAIN_FUNCTIONAL_ASSESSMENT: ACTIVITIES ARE NOT PREVENTED

## 2023-03-28 ASSESSMENT — PAIN DESCRIPTION - DESCRIPTORS
DESCRIPTORS: DISCOMFORT
DESCRIPTORS: ACHING

## 2023-03-28 NOTE — PROGRESS NOTES
03/28/23 1330   Encounter Summary   Encounter Overview/Reason  Initial Encounter;Spiritual/Emotional Needs; Attempted Encounter   Service Provided For: Patient   Referral/Consult From: Rounding   Last Encounter  03/28/23   Complexity of Encounter Low   Begin Time 1325   End Time  1330   Total Time Calculated 5 min   Encounter    Type Follow up   Spiritual/Emotional needs   Type Spiritual Support   Assessment/Intervention/Outcome   Assessment Unable to assess   Intervention Sustaining Presence/Ministry of presence;Prayer (assurance of)/[place]   During my encounter with the 50 yr old patient, I attempted to visit with the pt on 6K. The patient appears to be resting (not responsive) now and I didn't want to disturb the patient. I or another Merly Ace will attempt to visit the patient or the family at another time. The pt was admitted due to cellulitis.

## 2023-03-28 NOTE — PLAN OF CARE
Problem: Respiratory - Adult  Goal: Clear lung sounds  3/27/2023 2005 by Linda Lopez RCP  Outcome: Progressing   Continue inhaled txs.  Patient mutually agrees on goals;

## 2023-03-28 NOTE — PLAN OF CARE
Problem: Respiratory - Adult  Goal: Clear lung sounds  3/28/2023 0819 by Ever Pastrana RCP  Outcome: Progressing  Note: Patient agrees to goals

## 2023-03-28 NOTE — PROGRESS NOTES
[facility]   Nephrology progress note  3/28/2023, 11:09 AM      Pt Name:    Gila Evans  MRN:     [id number]     YOB: 1970  Admit Date:    3/24/2023 10:23 PM    Chief Complaint: Nephrology following for SHOLA/CKD. Subjective:  Patient seen and examined  No chest pain shortness of breath resting comfortably  Good UOP    Objective:  24HR INTAKE/OUTPUT:    Intake/Output Summary (Last 24 hours) at 3/28/2023 1109  Last data filed at 3/28/2023 0731  Gross per 24 hour   Intake 975 ml   Output 2600 ml   Net -1625 ml        Admission weight: (!) 315 lb (142.9 kg)    Wt Readings from Last 3 Encounters:   03/28/23 (!) 327 lb (148.3 kg)        Vitals :   Vitals:    03/27/23 2100 03/27/23 2317 03/28/23 0234 03/28/23 0930   BP: (!) 175/93 (!) 145/88 132/74 (!) 163/91   Pulse: (!) 103 (!) 104 93 93   Resp: 18 18 18 18   Temp: 98.3 °F (36.8 °C) 98.2 °F (36.8 °C) 98.4 °F (36.9 °C) 98.6 °F (37 °C)   TempSrc: Axillary Axillary Axillary Oral   SpO2: 96% 95% 93% 92%   Weight:   (!) 327 lb (148.3 kg)    Height:           Physical examination  General Appearance:  Well developed.  No distress  Mouth/Throat:  Oral mucosa moist  Neck:  Supple, no JVD  Lungs:  Breath sounds: clear  Heart[de-identified]  S1,S2 heard  Abdomen:  Soft, non - tender  Musculoskeletal:  Edema and erythema-noted on the left leg getting better    Medications:  Infusion:    dextrose      sodium chloride 125 mL/hr at 03/27/23 1326     Meds:    ipratropium  2 puff Inhalation BID    albuterol sulfate HFA  2 puff Inhalation BID    insulin lispro  0-4 Units SubCUTAneous TID     insulin lispro  0-4 Units SubCUTAneous Nightly    predniSONE  20 mg Oral Daily    diphenhydrAMINE  25 mg Oral Nightly    cefTRIAXone (ROCEPHIN) IV  2,000 mg IntraVENous Q24H    [Held by provider] clindamycin (CLEOCIN) IV  900 mg IntraVENous Q8H    amLODIPine  2.5 mg Oral Daily    vitamin D  50,000 Units Oral Weekly    cetirizine  10 mg Oral Daily    sodium chloride flush

## 2023-03-28 NOTE — PLAN OF CARE
Problem: Discharge Planning  Goal: Discharge to home or other facility with appropriate resources  Outcome: Progressing  Flowsheets (Taken 3/28/2023 1418)  Discharge to home or other facility with appropriate resources:   Identify barriers to discharge with patient and caregiver   Arrange for needed discharge resources and transportation as appropriate     Problem: Safety - Adult  Goal: Free from fall injury  Outcome: Progressing  Flowsheets (Taken 3/28/2023 1418)  Free From Fall Injury: Instruct family/caregiver on patient safety

## 2023-03-28 NOTE — PROGRESS NOTES
[address] RENAL TELEMETRY 6K  Occupational Therapy  Daily Note  Time:   Time In: 1024  Time Out: 1034  Timed Code Treatment Minutes: 10 Minutes  Minutes: 10          Date: 3/28/2023  Patient Name: Jay Ramos,   Gender: male      Room: [facility]/023-  MRN: [id number]  : 1970  (50 y.o.)  Referring Practitioner: Marek Raymond DO  Diagnosis: Septicemia  Additional Pertinent Hx: per H&P, \"Patient presents to the ED with left lower extremity swelling and redness for the past 3 days. The patient denies any recent injury, wounds, or rash on the lower extremity. The patient has no significant medical history including no history of DVT. There are no fevers, chills, or shortness of breath. Patient is admitted for left lower extremity cellulitis and DVT r/o. \"    Restrictions/Precautions:  Restrictions/Precautions: General Precautions, Fall Risk  Position Activity Restriction  Other position/activity restrictions: LLE Cellulitis     SUBJECTIVE: Nurse approved Tx. Pt asleep upon arrival. Agreeable to UE ex's, denied OOB. PAIN: not stated      Vitals: Vitals not assessed per clinical judgement, see nursing flowsheet    COGNITION: WNL    ADL:   No ADL's completed this session. .      ADDITIONAL ACTIVITIES:  Pt completed BUE ex's with orange theraband in all planes, 1x15 ea to increase UB strength for self care tasks and transfers. ASSESSMENT:     Activity Tolerance:  Patient tolerance of  treatment: Good treatment tolerance       Discharge Recommendations: Continue to assess pending progress  Equipment Recommendations: Equipment Needed: Yes  Other: rolling walker, LHAE  Plan: Times Per Week: 5x  Times Per Day:  Once a day  Current Treatment Recommendations: Strengthening, Balance training, Functional mobility training, Endurance training, Safety education & training, Patient/Caregiver education & training, Equipment evaluation, education, & procurement, Self-Care / ADL, Home management training    Patient Education  Patient Education: Role of OT and Home Exercise Program     Goals  Short Term Goals  Time Frame for Short Term Goals: Until discharge  Short Term Goal 1: Pt will complete BUE strengthening exercises with min vcs for technique to increase indep and endurance with all self cares. Short Term Goal 2: Pt will complete standing tolerance x 5 minutes with 2 UE release and S to increase indep and endurance with sinkside grooming. Short Term Goal 3: Pt will complete functional mobility to/from BR and HH distances with S to increase indep and endurance with all toileting. Short Term Goal 4: Pt will complete LB dressing with LHAE PRN and min A to increase indep and endurance within home environment. Short Term Goal 5: Pt will complete toileting task including hygiene with SBA to increase indep in home environment. Additional Goals?: No    Following session, patient left in safe position with all fall risk precautions in place.

## 2023-03-28 NOTE — RT PROTOCOL NOTE
RT Inhaler-Nebulizer Bronchodilator Protocol Note    There is a bronchodilator order in the chart from a provider indicating to follow the RT Bronchodilator Protocol and there is an Initiate RT Inhaler-Nebulizer Bronchodilator Protocol order as well (see protocol at bottom of note). CXR Findings:  XR CHEST PORTABLE    Result Date: 3/27/2023  1. Patchy airspace opacities at the dependent lung bases are noted which may represent atelectasis or pneumonia. **This report has been created using voice recognition software. It may contain minor errors which are inherent in voice recognition technology. ** Final report electronically signed by Dr. Lb Bruno on 3/27/2023 2:17 PM      The findings from the last RT Protocol Assessment were as follows:   History Pulmonary Disease: None or smoker <15 pack years  Respiratory Pattern: Regular pattern and RR 12-20 bpm  Breath Sounds: Slightly diminished and/or crackles  Cough: Strong, spontaneous, non-productive  Indication for Bronchodilator Therapy: Decreased or absent breath sounds  Bronchodilator Assessment Score: 2    Aerosolized bronchodilator medication orders have been revised according to the RT Inhaler-Nebulizer Bronchodilator Protocol below. Respiratory Therapist to perform RT Therapy Protocol Assessment initially then follow the protocol. Repeat RT Therapy Protocol Assessment PRN for score 0-3 or on second treatment, BID, and PRN for scores above 3. No Indications - adjust the frequency to every 6 hours PRN wheezing or bronchospasm, if no treatments needed after 48 hours then discontinue using Per Protocol order mode. If indication present, adjust the RT bronchodilator orders based on the Bronchodilator Assessment Score as indicated below.   Use Inhaler orders unless patient has one or more of the following: on home nebulizer, not able to hold breath for 10 seconds, is not alert and oriented, cannot activate and use MDI correctly, or respiratory rate 25 breaths per minute or more, then use the equivalent nebulizer order(s) with same Frequency and PRN reasons based on the score. If a patient is on this medication at home then do not decrease Frequency below that used at home. 0-3 - enter or revise RT bronchodilator order(s) to equivalent RT Bronchodilator order with Frequency of every 4 hours PRN for wheezing or increased work of breathing using Per Protocol order mode. 4-6 - enter or revise RT Bronchodilator order(s) to two equivalent RT bronchodilator orders with one order with BID Frequency and one order with Frequency of every 4 hours PRN wheezing or increased work of breathing using Per Protocol order mode. 7-10 - enter or revise RT Bronchodilator order(s) to two equivalent RT bronchodilator orders with one order with TID Frequency and one order with Frequency of every 4 hours PRN wheezing or increased work of breathing using Per Protocol order mode. 11-13 - enter or revise RT Bronchodilator order(s) to one equivalent RT bronchodilator order with QID Frequency and an Albuterol order with Frequency of every 4 hours PRN wheezing or increased work of breathing using Per Protocol order mode. Greater than 13 - enter or revise RT Bronchodilator order(s) to one equivalent RT bronchodilator order with every 4 hours Frequency and an Albuterol order with Frequency of every 2 hours PRN wheezing or increased work of breathing using Per Protocol order mode. RT to enter RT Home Evaluation for COPD & MDI Assessment order using Per Protocol order mode.     Electronically signed by Cali Fritz RCP on 3/28/2023 at 5:37 PM

## 2023-03-28 NOTE — PLAN OF CARE
Problem: Discharge Planning  Goal: Discharge to home or other facility with appropriate resources  3/27/2023 2332 by Gilberto Burton RN  Outcome: Progressing  Flowsheets (Taken 3/27/2023 2332)  Discharge to home or other facility with appropriate resources:   Identify barriers to discharge with patient and caregiver   Arrange for needed discharge resources and transportation as appropriate   Identify discharge learning needs (meds, wound care, etc)   Arrange for interpreters to assist at discharge as needed     Problem: Pain  Goal: Verbalizes/displays adequate comfort level or baseline comfort level  3/27/2023 2332 by Gilberto Burton RN  Outcome: Progressing  Flowsheets (Taken 3/27/2023 2332)  Verbalizes/displays adequate comfort level or baseline comfort level:   Encourage patient to monitor pain and request assistance   Assess pain using appropriate pain scale   Administer analgesics based on type and severity of pain and evaluate response   Implement non-pharmacological measures as appropriate and evaluate response   Consider cultural and social influences on pain and pain management     Problem: Safety - Adult  Goal: Free from fall injury  3/27/2023 2332 by Gilberto Burton RN  Outcome: Progressing  Flowsheets (Taken 3/27/2023 2332)  Free From Fall Injury:   Instruct family/caregiver on patient safety   Based on caregiver fall risk screen, instruct family/caregiver to ask for assistance with transferring infant if caregiver noted to have fall risk factors  Note: No fall throughout the shift. Call light are within reach. Problem: Skin/Tissue Integrity  Goal: Absence of new skin breakdown  Description: 1. Monitor for areas of redness and/or skin breakdown  2. Assess vascular access sites hourly  3. Every 4-6 hours minimum:  Change oxygen saturation probe site  4.   Every 4-6 hours:  If on nasal continuous positive airway pressure, respiratory therapy assess nares and determine need for appliance change or

## 2023-03-28 NOTE — RT PROTOCOL NOTE
25 breaths per minute or more, then use the equivalent nebulizer order(s) with same Frequency and PRN reasons based on the score. If a patient is on this medication at home then do not decrease Frequency below that used at home. 0-3 - enter or revise RT bronchodilator order(s) to equivalent RT Bronchodilator order with Frequency of every 4 hours PRN for wheezing or increased work of breathing using Per Protocol order mode. 4-6 - enter or revise RT Bronchodilator order(s) to two equivalent RT bronchodilator orders with one order with BID Frequency and one order with Frequency of every 4 hours PRN wheezing or increased work of breathing using Per Protocol order mode. 7-10 - enter or revise RT Bronchodilator order(s) to two equivalent RT bronchodilator orders with one order with TID Frequency and one order with Frequency of every 4 hours PRN wheezing or increased work of breathing using Per Protocol order mode. 11-13 - enter or revise RT Bronchodilator order(s) to one equivalent RT bronchodilator order with QID Frequency and an Albuterol order with Frequency of every 4 hours PRN wheezing or increased work of breathing using Per Protocol order mode. Greater than 13 - enter or revise RT Bronchodilator order(s) to one equivalent RT bronchodilator order with every 4 hours Frequency and an Albuterol order with Frequency of every 2 hours PRN wheezing or increased work of breathing using Per Protocol order mode. RT to enter RT Home Evaluation for COPD & MDI Assessment order using Per Protocol order mode.     Electronically signed by Maryann Richardson RCP on 3/28/2023 at 8:22 AM

## 2023-03-28 NOTE — RT PROTOCOL NOTE
RT Inhaler-Nebulizer Bronchodilator Protocol Note    There is a bronchodilator order in the chart from a provider indicating to follow the RT Bronchodilator Protocol and there is an Initiate RT Inhaler-Nebulizer Bronchodilator Protocol order as well (see protocol at bottom of note). CXR Findings:  XR CHEST PORTABLE    Result Date: 3/27/2023  1. Patchy airspace opacities at the dependent lung bases are noted which may represent atelectasis or pneumonia. **This report has been created using voice recognition software. It may contain minor errors which are inherent in voice recognition technology. ** Final report electronically signed by Dr. Fabi Lopez on 3/27/2023 2:17 PM      The findings from the last RT Protocol Assessment were as follows:   History Pulmonary Disease: None or smoker <15 pack years  Respiratory Pattern: Dyspnea on exertion or RR 21-25 bpm  Breath Sounds: Intermittent or unilateral wheezes  Cough: Strong, spontaneous, non-productive  Indication for Bronchodilator Therapy: Decreased or absent breath sounds  Bronchodilator Assessment Score: 6    Aerosolized bronchodilator medication orders have been revised according to the RT Inhaler-Nebulizer Bronchodilator Protocol below. Respiratory Therapist to perform RT Therapy Protocol Assessment initially then follow the protocol. Repeat RT Therapy Protocol Assessment PRN for score 0-3 or on second treatment, BID, and PRN for scores above 3. No Indications - adjust the frequency to every 6 hours PRN wheezing or bronchospasm, if no treatments needed after 48 hours then discontinue using Per Protocol order mode. If indication present, adjust the RT bronchodilator orders based on the Bronchodilator Assessment Score as indicated below.   Use Inhaler orders unless patient has one or more of the following: on home nebulizer, not able to hold breath for 10 seconds, is not alert and oriented, cannot activate and use MDI correctly, or respiratory rate 25 breaths per minute or more, then use the equivalent nebulizer order(s) with same Frequency and PRN reasons based on the score. If a patient is on this medication at home then do not decrease Frequency below that used at home. 0-3 - enter or revise RT bronchodilator order(s) to equivalent RT Bronchodilator order with Frequency of every 4 hours PRN for wheezing or increased work of breathing using Per Protocol order mode. 4-6 - enter or revise RT Bronchodilator order(s) to two equivalent RT bronchodilator orders with one order with BID Frequency and one order with Frequency of every 4 hours PRN wheezing or increased work of breathing using Per Protocol order mode. 7-10 - enter or revise RT Bronchodilator order(s) to two equivalent RT bronchodilator orders with one order with TID Frequency and one order with Frequency of every 4 hours PRN wheezing or increased work of breathing using Per Protocol order mode. 11-13 - enter or revise RT Bronchodilator order(s) to one equivalent RT bronchodilator order with QID Frequency and an Albuterol order with Frequency of every 4 hours PRN wheezing or increased work of breathing using Per Protocol order mode. Greater than 13 - enter or revise RT Bronchodilator order(s) to one equivalent RT bronchodilator order with every 4 hours Frequency and an Albuterol order with Frequency of every 2 hours PRN wheezing or increased work of breathing using Per Protocol order mode. RT to enter RT Home Evaluation for COPD & MDI Assessment order using Per Protocol order mode.     Electronically signed by Reyna Carrera RCP on 3/27/2023 at 8:01 PM

## 2023-03-28 NOTE — PROGRESS NOTES
fluid overload so one dose of lasix given. CXR pending. Subjective/HPI:   Silas Alcantar feels better overall. He denies HA, CP, N/V/D. Rash improving and no more episodes of tachypnea. Chest x-ray showed some bibasilar atelectasis. Tolerating Rocephin well. Repeat blood cultures from 3/27 no growth at 24 hours. No more wheezing on exam today. Some hypertension with addition of steroids, will continue to monitor for need to increase in Norvasc. Echo pending. Will monitor for need of repeat dose Lasix. PMH, SURGICAL HX, FH, SOCIAL HX reviewed and updated as needed. Medications:  Reviewed    Infusion Medications    sodium chloride 125 mL/hr at 03/27/23 1326     Scheduled Medications    albuterol  2.5 mg Nebulization BID    ipratropium  0.5 mg Nebulization BID    predniSONE  20 mg Oral Daily    diphenhydrAMINE  25 mg Oral Nightly    cefTRIAXone (ROCEPHIN) IV  2,000 mg IntraVENous Q24H    [Held by provider] clindamycin (CLEOCIN) IV  900 mg IntraVENous Q8H    amLODIPine  2.5 mg Oral Daily    vitamin D  50,000 Units Oral Weekly    cetirizine  10 mg Oral Daily    sodium chloride flush  5-40 mL IntraVENous 2 times per day    enoxaparin  30 mg SubCUTAneous Q12H    calcium replacement protocol   Other RX Placeholder     PRN Meds: ipratropium, albuterol sulfate HFA, potassium chloride **OR** potassium alternative oral replacement **OR** potassium chloride, sodium chloride flush, sodium chloride, ondansetron **OR** ondansetron, polyethylene glycol, acetaminophen **OR** acetaminophen      Intake/Output Summary (Last 24 hours) at 3/28/2023 0805  Last data filed at 3/28/2023 0731  Gross per 24 hour   Intake 1050 ml   Output 2975 ml   Net -1925 ml         Exam:  /74   Pulse 93   Temp 98.4 °F (36.9 °C) (Axillary)   Resp 18   Ht 5' 9\" (1.753 m)   Wt (!) 327 lb (148.3 kg)   SpO2 93%   BMI 48.29 kg/m²     Physical Exam  Constitutional:       General: He is not in acute distress. Appearance: He is obese. NSR    DVT prophylaxis: [x] Lovenox                                 [] SCDs                                 [] SQ Heparin                                 [] Encourage ambulation           [] Already on Anticoagulation     Disposition:    [x] Home       [] TCU       [] Rehab       [] Psych       [] SNF       [] [place]       [] Other-    Code Status: Full Code    PT/OT Eval Status: not consulted      Electronically signed by Monet Avilez DO on 3/28/2023 at 8:05 AM

## 2023-03-29 ENCOUNTER — APPOINTMENT (OUTPATIENT)
Dept: GENERAL RADIOLOGY | Age: 53
End: 2023-03-29
Payer: COMMERCIAL

## 2023-03-29 LAB
ANION GAP SERPL CALC-SCNC: 9 MEQ/L (ref 8–16)
BASOPHILS ABSOLUTE: 0 THOU/MM3 (ref 0–0.1)
BASOPHILS NFR BLD AUTO: 0.1 %
BUN SERPL-MCNC: 28 MG/DL (ref 7–22)
CALCIUM SERPL-MCNC: 8.1 MG/DL (ref 8.5–10.5)
CHLORIDE SERPL-SCNC: 100 MEQ/L (ref 98–111)
CO2 SERPL-SCNC: 30 MEQ/L (ref 23–33)
CREAT SERPL-MCNC: 1 MG/DL (ref 0.4–1.2)
DEPRECATED RDW RBC AUTO: 48.9 FL (ref 35–45)
EOSINOPHIL NFR BLD AUTO: 0.8 %
EOSINOPHILS ABSOLUTE: 0.1 THOU/MM3 (ref 0–0.4)
ERYTHROCYTE [DISTWIDTH] IN BLOOD BY AUTOMATED COUNT: 14.6 % (ref 11.5–14.5)
GFR SERPL CREATININE-BSD FRML MDRD: > 60 ML/MIN/1.73M2
GLUCOSE BLD STRIP.AUTO-MCNC: 115 MG/DL (ref 70–108)
GLUCOSE BLD STRIP.AUTO-MCNC: 126 MG/DL (ref 70–108)
GLUCOSE BLD STRIP.AUTO-MCNC: 134 MG/DL (ref 70–108)
GLUCOSE BLD STRIP.AUTO-MCNC: 142 MG/DL (ref 70–108)
GLUCOSE SERPL-MCNC: 134 MG/DL (ref 70–108)
HCT VFR BLD AUTO: 43.3 % (ref 42–52)
HGB BLD-MCNC: 13.6 GM/DL (ref 14–18)
IMM GRANULOCYTES # BLD AUTO: 1.42 THOU/MM3 (ref 0–0.07)
IMM GRANULOCYTES NFR BLD AUTO: 8.3 %
LYMPHOCYTES ABSOLUTE: 2.7 THOU/MM3 (ref 1–4.8)
LYMPHOCYTES NFR BLD AUTO: 15.7 %
MAGNESIUM SERPL-MCNC: 2.3 MG/DL (ref 1.6–2.4)
MCH RBC QN AUTO: 28.6 PG (ref 26–33)
MCHC RBC AUTO-ENTMCNC: 31.4 GM/DL (ref 32.2–35.5)
MCV RBC AUTO: 91.2 FL (ref 80–94)
MONOCYTES ABSOLUTE: 1.2 THOU/MM3 (ref 0.4–1.3)
MONOCYTES NFR BLD AUTO: 7 %
NEUTROPHILS NFR BLD AUTO: 68.1 %
NRBC BLD AUTO-RTO: 0 /100 WBC
PLATELET # BLD AUTO: 365 THOU/MM3 (ref 130–400)
PMV BLD AUTO: 9.5 FL (ref 9.4–12.4)
POTASSIUM SERPL-SCNC: 3.3 MEQ/L (ref 3.5–5.2)
RBC # BLD AUTO: 4.75 MILL/MM3 (ref 4.7–6.1)
SEGMENTED NEUTROPHILS ABSOLUTE COUNT: 11.7 THOU/MM3 (ref 1.8–7.7)
SODIUM SERPL-SCNC: 139 MEQ/L (ref 135–145)
WBC # BLD AUTO: 17.2 THOU/MM3 (ref 4.8–10.8)

## 2023-03-29 PROCEDURE — 97535 SELF CARE MNGMENT TRAINING: CPT

## 2023-03-29 PROCEDURE — 99232 SBSQ HOSP IP/OBS MODERATE 35: CPT | Performed by: INTERNAL MEDICINE

## 2023-03-29 PROCEDURE — 6370000000 HC RX 637 (ALT 250 FOR IP)

## 2023-03-29 PROCEDURE — 36415 COLL VENOUS BLD VENIPUNCTURE: CPT

## 2023-03-29 PROCEDURE — 2580000003 HC RX 258: Performed by: PHYSICIAN ASSISTANT

## 2023-03-29 PROCEDURE — 85025 COMPLETE CBC W/AUTO DIFF WBC: CPT

## 2023-03-29 PROCEDURE — 80048 BASIC METABOLIC PNL TOTAL CA: CPT

## 2023-03-29 PROCEDURE — 82948 REAGENT STRIP/BLOOD GLUCOSE: CPT

## 2023-03-29 PROCEDURE — 6370000000 HC RX 637 (ALT 250 FOR IP): Performed by: INTERNAL MEDICINE

## 2023-03-29 PROCEDURE — 83735 ASSAY OF MAGNESIUM: CPT

## 2023-03-29 PROCEDURE — 2580000003 HC RX 258

## 2023-03-29 PROCEDURE — 6370000000 HC RX 637 (ALT 250 FOR IP): Performed by: PHYSICIAN ASSISTANT

## 2023-03-29 PROCEDURE — 1200000003 HC TELEMETRY R&B

## 2023-03-29 PROCEDURE — 71045 X-RAY EXAM CHEST 1 VIEW: CPT

## 2023-03-29 PROCEDURE — 6360000002 HC RX W HCPCS: Performed by: PHYSICIAN ASSISTANT

## 2023-03-29 PROCEDURE — 6360000002 HC RX W HCPCS

## 2023-03-29 PROCEDURE — 6360000002 HC RX W HCPCS: Performed by: INTERNAL MEDICINE

## 2023-03-29 PROCEDURE — 1200000000 HC SEMI PRIVATE

## 2023-03-29 PROCEDURE — 97530 THERAPEUTIC ACTIVITIES: CPT

## 2023-03-29 RX ORDER — POTASSIUM CHLORIDE 20 MEQ/1
20 TABLET, EXTENDED RELEASE ORAL 2 TIMES DAILY WITH MEALS
Status: DISCONTINUED | OUTPATIENT
Start: 2023-03-29 | End: 2023-03-29

## 2023-03-29 RX ORDER — FUROSEMIDE 10 MG/ML
20 INJECTION INTRAMUSCULAR; INTRAVENOUS ONCE
Status: COMPLETED | OUTPATIENT
Start: 2023-03-29 | End: 2023-03-29

## 2023-03-29 RX ORDER — POTASSIUM CHLORIDE 20 MEQ/1
20 TABLET, EXTENDED RELEASE ORAL 2 TIMES DAILY
Status: DISCONTINUED | OUTPATIENT
Start: 2023-03-29 | End: 2023-04-01 | Stop reason: HOSPADM

## 2023-03-29 RX ORDER — AMLODIPINE BESYLATE 5 MG/1
5 TABLET ORAL DAILY
Status: DISCONTINUED | OUTPATIENT
Start: 2023-03-29 | End: 2023-03-29

## 2023-03-29 RX ORDER — AMLODIPINE BESYLATE 10 MG/1
10 TABLET ORAL DAILY
Status: DISCONTINUED | OUTPATIENT
Start: 2023-03-30 | End: 2023-04-01 | Stop reason: HOSPADM

## 2023-03-29 RX ORDER — POTASSIUM CHLORIDE 20 MEQ/1
20 TABLET, EXTENDED RELEASE ORAL DAILY
Status: DISCONTINUED | OUTPATIENT
Start: 2023-03-29 | End: 2023-03-29

## 2023-03-29 RX ORDER — POTASSIUM CHLORIDE 20 MEQ/1
20 TABLET, EXTENDED RELEASE ORAL ONCE
Status: DISCONTINUED | OUTPATIENT
Start: 2023-03-29 | End: 2023-03-29

## 2023-03-29 RX ADMIN — AMLODIPINE BESYLATE 5 MG: 5 TABLET ORAL at 11:25

## 2023-03-29 RX ADMIN — POTASSIUM CHLORIDE 20 MEQ: 1500 TABLET, EXTENDED RELEASE ORAL at 14:24

## 2023-03-29 RX ADMIN — SODIUM CHLORIDE, PRESERVATIVE FREE 10 ML: 5 INJECTION INTRAVENOUS at 11:27

## 2023-03-29 RX ADMIN — DIPHENHYDRAMINE HYDROCHLORIDE 25 MG: 25 TABLET ORAL at 20:10

## 2023-03-29 RX ADMIN — SODIUM CHLORIDE, PRESERVATIVE FREE 10 ML: 5 INJECTION INTRAVENOUS at 20:11

## 2023-03-29 RX ADMIN — ENOXAPARIN SODIUM 30 MG: 100 INJECTION SUBCUTANEOUS at 14:25

## 2023-03-29 RX ADMIN — PREDNISONE 20 MG: 20 TABLET ORAL at 11:25

## 2023-03-29 RX ADMIN — FUROSEMIDE 20 MG: 10 INJECTION, SOLUTION INTRAMUSCULAR; INTRAVENOUS at 11:38

## 2023-03-29 RX ADMIN — HYDROMORPHONE HYDROCHLORIDE 0.25 MG: 1 INJECTION, SOLUTION INTRAMUSCULAR; INTRAVENOUS; SUBCUTANEOUS at 02:02

## 2023-03-29 RX ADMIN — HYDROMORPHONE HYDROCHLORIDE 0.25 MG: 1 INJECTION, SOLUTION INTRAMUSCULAR; INTRAVENOUS; SUBCUTANEOUS at 06:03

## 2023-03-29 RX ADMIN — CETIRIZINE HYDROCHLORIDE 10 MG: 10 TABLET, FILM COATED ORAL at 11:25

## 2023-03-29 RX ADMIN — ACETAMINOPHEN 650 MG: 325 TABLET ORAL at 08:49

## 2023-03-29 RX ADMIN — CEFTRIAXONE SODIUM 2000 MG: 2 INJECTION, POWDER, FOR SOLUTION INTRAMUSCULAR; INTRAVENOUS at 16:43

## 2023-03-29 RX ADMIN — ENOXAPARIN SODIUM 30 MG: 100 INJECTION SUBCUTANEOUS at 02:03

## 2023-03-29 RX ADMIN — POTASSIUM CHLORIDE 20 MEQ: 1500 TABLET, EXTENDED RELEASE ORAL at 20:10

## 2023-03-29 ASSESSMENT — PAIN SCALES - GENERAL
PAINLEVEL_OUTOF10: 2
PAINLEVEL_OUTOF10: 7
PAINLEVEL_OUTOF10: 4
PAINLEVEL_OUTOF10: 7
PAINLEVEL_OUTOF10: 9
PAINLEVEL_OUTOF10: 9

## 2023-03-29 ASSESSMENT — PAIN DESCRIPTION - DESCRIPTORS
DESCRIPTORS: ACHING

## 2023-03-29 ASSESSMENT — PAIN DESCRIPTION - LOCATION
LOCATION: LEG

## 2023-03-29 ASSESSMENT — PAIN DESCRIPTION - PAIN TYPE: TYPE: ACUTE PAIN

## 2023-03-29 ASSESSMENT — PAIN DESCRIPTION - ORIENTATION
ORIENTATION: LEFT

## 2023-03-29 ASSESSMENT — PAIN - FUNCTIONAL ASSESSMENT: PAIN_FUNCTIONAL_ASSESSMENT: ACTIVITIES ARE NOT PREVENTED

## 2023-03-29 NOTE — PLAN OF CARE
Progressing  Note: No new skin issues. Problem: Skin/Tissue Integrity - Adult  Goal: Incisions, wounds, or drain sites healing without S/S of infection  3/29/2023 0241 by Pamela Perez RN  Outcome: Progressing  Flowsheets  Taken 3/29/2023 0241  Incisions, Wounds, or Drain Sites Healing Without Sign and Symptoms of Infection: TWICE DAILY: Assess and document skin integrity  Taken 3/29/2023 0234  Incisions, Wounds, or Drain Sites Healing Without Sign and Symptoms of Infection:   TWICE DAILY: Assess and document skin integrity   Implement wound care per orders     Care plan reviewed with patient. Patient verbalize understanding of the plan of care and contribute to goal setting.

## 2023-03-29 NOTE — PROGRESS NOTES
03/27/23  0754 03/28/23  0526 03/29/23  0539   WBC 17.0* 20.6* 17.2*   HGB 13.4* 13.4* 13.6*   HCT 42.4 39.1* 43.3    302 365       CMP:  Recent Labs     03/27/23  0754 03/27/23  1619 03/28/23  0526 03/29/23  0539   *  --  137 139   K 3.5 4.0 3.4* 3.3*   CL 95*  --  99 100   CO2 25  --  28 30   BUN 32*  --  35* 28*   CREATININE 1.1  --  1.1 1.0   GLUCOSE 202*  --  184* 134*   CALCIUM 8.5  --  8.2* 8.1*   MG  --   --  2.4 2.3       Hepatic:   No results for input(s): LABALBU, AST, ALT, ALB, BILITOT, ALKPHOS in the last 72 hours. Assessment and Plan:  Renal -acute kidney injury again etiology not clear may be due to sepsis however cannot rule out a component of chronic kidney disease at this time. Overall creatinine getting better with IV fluids  Renal ultrasound scan shows large kidneys  Off IV fluids  Electrolytes -hypokalemia continues to run low increase KCl to 20 twice daily  Blood pressure does not officially carry a diagnosis of hypertension but blood pressure appears to be running high increase Norvasc to 10 mg p.o. daily  Left leg cellulitis on antibiotics appears to be getting better  Hypocalcemia-corrected calcium appears to be reasonable. Continue vitamin D  Meds reviewed     Hussein Rothman MD  [facility]    This report has been created using voice recognition software.  It may contain minor errors which are inherent in voice recognition technology

## 2023-03-29 NOTE — RT PROTOCOL NOTE
breaths per minute or more, then use the equivalent nebulizer order(s) with same Frequency and PRN reasons based on the score. If a patient is on this medication at home then do not decrease Frequency below that used at home. 0-3 - enter or revise RT bronchodilator order(s) to equivalent RT Bronchodilator order with Frequency of every 4 hours PRN for wheezing or increased work of breathing using Per Protocol order mode. 4-6 - enter or revise RT Bronchodilator order(s) to two equivalent RT bronchodilator orders with one order with BID Frequency and one order with Frequency of every 4 hours PRN wheezing or increased work of breathing using Per Protocol order mode. 7-10 - enter or revise RT Bronchodilator order(s) to two equivalent RT bronchodilator orders with one order with TID Frequency and one order with Frequency of every 4 hours PRN wheezing or increased work of breathing using Per Protocol order mode. 11-13 - enter or revise RT Bronchodilator order(s) to one equivalent RT bronchodilator order with QID Frequency and an Albuterol order with Frequency of every 4 hours PRN wheezing or increased work of breathing using Per Protocol order mode. Greater than 13 - enter or revise RT Bronchodilator order(s) to one equivalent RT bronchodilator order with every 4 hours Frequency and an Albuterol order with Frequency of every 2 hours PRN wheezing or increased work of breathing using Per Protocol order mode. RT to enter RT Home Evaluation for COPD & MDI Assessment order using Per Protocol order mode.     Electronically signed by Luis Restrepo RCP on 3/29/2023 at 10:45 AM

## 2023-03-29 NOTE — PROGRESS NOTES
PROGRESS NOTE      Patient:  Ja Washington  Unit/Bed:6K-23/023-A  YOB: 1970  MRN: [id number]   Acct: [de-identified]    PCP: Juan Briones DO    Date of Admission: 3/24/2023 LOS: 5    Date of Evaluation:  3/29/2023    Anticipated Discharge: Pending clinical course    Assessment/Plan:    Sepsis with bacteremia 2/2 cellulitis of the left lower extremity  S/p 3L IV fluid bolus on 3/25/2023  BioFire with strep pyogenes; blood cultures-3/23 growing strep pyogenes sensitive to penicillin G, vancomycin and Rocephin  Elevated WBCs at 12.4 on admission with tachycardia; leukocytosis worsened at 17 3/27 --> 20 3/28 (prednisone likely contributing factor)  Lactic acidosis 2.0 3/21, improved to 1.9 3/27  3/24 started on Ancef and vancomycin, changed to ampicillin 3/25 but patient remained tachycardic and increasingly febrile with Tmax 101.3 so Cleocin started 3/26 --> patient had episode of tachypnea and developed rash on chest and abdomen --> antibiotics discontinued with improvement s/p Benadryl and prednisone --> restarted ampicillin 3/27  with spread of rash --> started therapy with rocephin 2g q24h, will continue to monitor for symptoms --> last dose IV antibiotics 3/32 continue oral antibiotics for 7 days (14 days total therapy)  Repeat blood cultures 3/27 no growth at 24 or 48 hrs; will adjust antibiotics as needed  Echo 3/27 with EF 50-55% and normal LVFx  Cellulitis left lower extremity  Improving; area of warmth, erythema and edema decreasing  Duplex US BL LEs 3/25/23 and repeat 3/26/23 negative for DVT and showing normal arterial flow  X-ray LLE 3/23/2023 positive for soft tissue edema no signs of subcu gas or bony abnormality  Continue Rocephin as above  Drug reaction  Patient had episode of tachypnea associated with erythematous rash on chest and abdomen 3/26 in the evening --> antibiotics discontinued overnight and patient given Benadryl and Solu-Medrol --> recurred when Ampicillin restarted started on Cleocin due to increasing fever. Awaiting blood culture results. Nephrology following.    3/27/23: Blood cultures growing strep pyogenes sensitive to penicillin G, clindamycin and Rocephin. Patient had drug reaction (rash on chest and tachypnea) with ampicillin and clindamycin so was transitioned to Rocephin 2 g every 24 hours. Started on prednisone and Benadryl x3 days. Scattered wheezes on exam, possible from drug reaction but may have element of fluid overload so one dose of lasix given. CXR pending. 3/28/23: Rash improving and no more episodes of tachypnea. Chest x-ray showed some bibasilar atelectasis. Tolerating Rocephin well. Repeat blood cultures from 3/27 no growth at 24 hours. No more wheezing on exam today. Some hypertension with addition of steroids, will continue to monitor for need to increase in Norvasc. Echo pending. Will monitor for need of repeat dose Lasix. Subjective/HPI:   Christelle Saini feels better overall. He denies HA, CP, N/V/D. Rash continues to improve. Patient reports some shortness of breath, mild scattered wheezes on exam. Patient remains hypertensive but denies symptoms. Reports improved pain in bl Les. Rash on chest improving and area of cellulitis consolidating. No fevers or chills or systemic symptoms. Hemodynamically stable. PMH, SURGICAL HX, FH, SOCIAL HX reviewed and updated as needed.     Medications:  Reviewed    Infusion Medications    dextrose      sodium chloride 125 mL/hr at 03/27/23 1326     Scheduled Medications    amLODIPine  5 mg Oral Daily    insulin lispro  0-4 Units SubCUTAneous TID     insulin lispro  0-4 Units SubCUTAneous Nightly    potassium chloride  20 mEq Oral Daily with breakfast    predniSONE  20 mg Oral Daily    diphenhydrAMINE  25 mg Oral Nightly    cefTRIAXone (ROCEPHIN) IV  2,000 mg IntraVENous Q24H    [Held by provider] clindamycin (CLEOCIN) IV  900 mg IntraVENous Q8H    vitamin D  50,000 Units Oral Weekly    cetirizine

## 2023-03-29 NOTE — PROGRESS NOTES
Discharge Recommendations: Home with assist as needed  Equipment Recommendations: Equipment Needed: Yes  Other: rolling walker, LHAE  Plan: Times Per Week: 5x  Times Per Day: Once a day  Current Treatment Recommendations: Strengthening, Balance training, Functional mobility training, Endurance training, Safety education & training, Patient/Caregiver education & training, Equipment evaluation, education, & procurement, Self-Care / ADL, Home management training    Patient Education  Patient Education: ADL's    Goals  Short Term Goals  Time Frame for Short Term Goals: Until discharge  Short Term Goal 1: Pt will complete BUE strengthening exercises with min vcs for technique to increase indep and endurance with all self cares. Short Term Goal 2: Pt will complete standing tolerance x 5 minutes with 2 UE release and S to increase indep and endurance with sinkside grooming. Short Term Goal 3: Pt will complete functional mobility to/from BR and HH distances with S to increase indep and endurance with all toileting. Short Term Goal 4: Pt will complete LB dressing with LHAE PRN and min A to increase indep and endurance within home environment. Short Term Goal 5: Pt will complete toileting task including hygiene with SBA to increase indep in home environment. Additional Goals?: No    Following session, patient left in safe position with all fall risk precautions in place.

## 2023-03-30 ENCOUNTER — APPOINTMENT (OUTPATIENT)
Dept: INTERVENTIONAL RADIOLOGY/VASCULAR | Age: 53
End: 2023-03-30
Payer: COMMERCIAL

## 2023-03-30 LAB
ANION GAP SERPL CALC-SCNC: 10 MEQ/L (ref 8–16)
BASOPHILS ABSOLUTE: 0 THOU/MM3 (ref 0–0.1)
BASOPHILS NFR BLD AUTO: 0 %
BUN SERPL-MCNC: 21 MG/DL (ref 7–22)
CA-I BLD ISE-SCNC: 0.96 MMOL/L (ref 1.12–1.32)
CALCIUM SERPL-MCNC: 7.8 MG/DL (ref 8.5–10.5)
CHLORIDE SERPL-SCNC: 99 MEQ/L (ref 98–111)
CO2 SERPL-SCNC: 28 MEQ/L (ref 23–33)
CREAT SERPL-MCNC: 0.9 MG/DL (ref 0.4–1.2)
DEPRECATED RDW RBC AUTO: 47.6 FL (ref 35–45)
EOSINOPHIL NFR BLD AUTO: 1 %
EOSINOPHILS ABSOLUTE: 0.2 THOU/MM3 (ref 0–0.4)
ERYTHROCYTE [DISTWIDTH] IN BLOOD BY AUTOMATED COUNT: 14.3 % (ref 11.5–14.5)
GFR SERPL CREATININE-BSD FRML MDRD: > 60 ML/MIN/1.73M2
GLUCOSE BLD STRIP.AUTO-MCNC: 109 MG/DL (ref 70–108)
GLUCOSE BLD STRIP.AUTO-MCNC: 137 MG/DL (ref 70–108)
GLUCOSE BLD STRIP.AUTO-MCNC: 145 MG/DL (ref 70–108)
GLUCOSE BLD STRIP.AUTO-MCNC: 164 MG/DL (ref 70–108)
GLUCOSE SERPL-MCNC: 142 MG/DL (ref 70–108)
HCT VFR BLD AUTO: 42.6 % (ref 42–52)
HGB BLD-MCNC: 13.5 GM/DL (ref 14–18)
LYMPHOCYTES ABSOLUTE: 2.9 THOU/MM3 (ref 1–4.8)
LYMPHOCYTES NFR BLD AUTO: 17 %
MANUAL DIFF BLD: NORMAL
MCH RBC QN AUTO: 28.6 PG (ref 26–33)
MCHC RBC AUTO-ENTMCNC: 31.7 GM/DL (ref 32.2–35.5)
MCV RBC AUTO: 90.3 FL (ref 80–94)
MONOCYTES ABSOLUTE: 1 THOU/MM3 (ref 0.4–1.3)
MONOCYTES NFR BLD AUTO: 6 %
NEUTROPHILS NFR BLD AUTO: 76 %
NRBC BLD AUTO-RTO: 0 /100 WBC
PLATELET # BLD AUTO: 354 THOU/MM3 (ref 130–400)
PLATELET BLD QL SMEAR: ADEQUATE
PMV BLD AUTO: 9.2 FL (ref 9.4–12.4)
POLYCHROMASIA BLD QL SMEAR: ABNORMAL
POTASSIUM SERPL-SCNC: 4.1 MEQ/L (ref 3.5–5.2)
RBC # BLD AUTO: 4.72 MILL/MM3 (ref 4.7–6.1)
SEGMENTED NEUTROPHILS ABSOLUTE COUNT: 12.8 THOU/MM3 (ref 1.8–7.7)
SODIUM SERPL-SCNC: 137 MEQ/L (ref 135–145)
TOXIC GRANULES BLD QL SMEAR: PRESENT
VARIANT LYMPHS BLD QL SMEAR: ABNORMAL %
WBC # BLD AUTO: 16.9 THOU/MM3 (ref 4.8–10.8)

## 2023-03-30 PROCEDURE — 80048 BASIC METABOLIC PNL TOTAL CA: CPT

## 2023-03-30 PROCEDURE — 6370000000 HC RX 637 (ALT 250 FOR IP): Performed by: PHYSICIAN ASSISTANT

## 2023-03-30 PROCEDURE — 6370000000 HC RX 637 (ALT 250 FOR IP)

## 2023-03-30 PROCEDURE — 36415 COLL VENOUS BLD VENIPUNCTURE: CPT

## 2023-03-30 PROCEDURE — 97530 THERAPEUTIC ACTIVITIES: CPT

## 2023-03-30 PROCEDURE — 2580000003 HC RX 258: Performed by: PHYSICIAN ASSISTANT

## 2023-03-30 PROCEDURE — 1200000000 HC SEMI PRIVATE

## 2023-03-30 PROCEDURE — 97162 PT EVAL MOD COMPLEX 30 MIN: CPT

## 2023-03-30 PROCEDURE — 6360000002 HC RX W HCPCS: Performed by: INTERNAL MEDICINE

## 2023-03-30 PROCEDURE — 6360000002 HC RX W HCPCS: Performed by: PHYSICIAN ASSISTANT

## 2023-03-30 PROCEDURE — 82948 REAGENT STRIP/BLOOD GLUCOSE: CPT

## 2023-03-30 PROCEDURE — 93970 EXTREMITY STUDY: CPT

## 2023-03-30 PROCEDURE — 99232 SBSQ HOSP IP/OBS MODERATE 35: CPT | Performed by: INTERNAL MEDICINE

## 2023-03-30 PROCEDURE — 85025 COMPLETE CBC W/AUTO DIFF WBC: CPT

## 2023-03-30 PROCEDURE — 2580000003 HC RX 258

## 2023-03-30 PROCEDURE — 6370000000 HC RX 637 (ALT 250 FOR IP): Performed by: INTERNAL MEDICINE

## 2023-03-30 PROCEDURE — 82330 ASSAY OF CALCIUM: CPT

## 2023-03-30 PROCEDURE — 6360000002 HC RX W HCPCS

## 2023-03-30 RX ORDER — LISINOPRIL 5 MG/1
5 TABLET ORAL DAILY
Status: DISCONTINUED | OUTPATIENT
Start: 2023-03-30 | End: 2023-04-01 | Stop reason: HOSPADM

## 2023-03-30 RX ADMIN — POTASSIUM CHLORIDE 20 MEQ: 1500 TABLET, EXTENDED RELEASE ORAL at 21:12

## 2023-03-30 RX ADMIN — LISINOPRIL 5 MG: 5 TABLET ORAL at 10:20

## 2023-03-30 RX ADMIN — ENOXAPARIN SODIUM 30 MG: 100 INJECTION SUBCUTANEOUS at 15:32

## 2023-03-30 RX ADMIN — SODIUM CHLORIDE, PRESERVATIVE FREE 10 ML: 5 INJECTION INTRAVENOUS at 09:00

## 2023-03-30 RX ADMIN — ENOXAPARIN SODIUM 30 MG: 100 INJECTION SUBCUTANEOUS at 02:05

## 2023-03-30 RX ADMIN — HYDROMORPHONE HYDROCHLORIDE 0.25 MG: 1 INJECTION, SOLUTION INTRAMUSCULAR; INTRAVENOUS; SUBCUTANEOUS at 09:52

## 2023-03-30 RX ADMIN — HYDROMORPHONE HYDROCHLORIDE 0.25 MG: 1 INJECTION, SOLUTION INTRAMUSCULAR; INTRAVENOUS; SUBCUTANEOUS at 17:59

## 2023-03-30 RX ADMIN — HYDROMORPHONE HYDROCHLORIDE 0.25 MG: 1 INJECTION, SOLUTION INTRAMUSCULAR; INTRAVENOUS; SUBCUTANEOUS at 13:41

## 2023-03-30 RX ADMIN — SODIUM CHLORIDE, PRESERVATIVE FREE 10 ML: 5 INJECTION INTRAVENOUS at 21:12

## 2023-03-30 RX ADMIN — CEFTRIAXONE SODIUM 2000 MG: 2 INJECTION, POWDER, FOR SOLUTION INTRAMUSCULAR; INTRAVENOUS at 18:00

## 2023-03-30 RX ADMIN — ACETAMINOPHEN 650 MG: 325 TABLET ORAL at 00:57

## 2023-03-30 RX ADMIN — CETIRIZINE HYDROCHLORIDE 10 MG: 10 TABLET, FILM COATED ORAL at 09:56

## 2023-03-30 RX ADMIN — POTASSIUM CHLORIDE 20 MEQ: 1500 TABLET, EXTENDED RELEASE ORAL at 09:56

## 2023-03-30 RX ADMIN — AMLODIPINE BESYLATE 10 MG: 10 TABLET ORAL at 09:56

## 2023-03-30 ASSESSMENT — PAIN SCALES - GENERAL
PAINLEVEL_OUTOF10: 8
PAINLEVEL_OUTOF10: 8
PAINLEVEL_OUTOF10: 7
PAINLEVEL_OUTOF10: 3
PAINLEVEL_OUTOF10: 10
PAINLEVEL_OUTOF10: 2

## 2023-03-30 ASSESSMENT — PAIN DESCRIPTION - FREQUENCY: FREQUENCY: INTERMITTENT

## 2023-03-30 ASSESSMENT — PAIN DESCRIPTION - LOCATION
LOCATION: LEG

## 2023-03-30 ASSESSMENT — PAIN DESCRIPTION - DESCRIPTORS
DESCRIPTORS: ACHING
DESCRIPTORS: ACHING
DESCRIPTORS: ACHING;PRESSURE
DESCRIPTORS: JABBING
DESCRIPTORS: BURNING;SHARP

## 2023-03-30 ASSESSMENT — PAIN DESCRIPTION - ORIENTATION
ORIENTATION: LEFT

## 2023-03-30 ASSESSMENT — PAIN DESCRIPTION - ONSET: ONSET: ON-GOING

## 2023-03-30 ASSESSMENT — PAIN DESCRIPTION - PAIN TYPE: TYPE: ACUTE PAIN

## 2023-03-30 ASSESSMENT — PAIN - FUNCTIONAL ASSESSMENT: PAIN_FUNCTIONAL_ASSESSMENT: ACTIVITIES ARE NOT PREVENTED

## 2023-03-30 NOTE — PROGRESS NOTES
[facility]  OCCUPATIONAL THERAPY MISSED TREATMENT NOTE  [facility] RENAL TELEMETRY 6K  6K-023-A      Date: 3/30/2023  Patient Name: Milton Aguilar        CSN: [id number]   : 1970  (50 y.o.)  Gender: male   Referring Practitioner: Nehemias Valdes DO  Diagnosis: Septicemia         REASON FOR MISSED TREATMENT: Patient Refused. Attemped to see pt this am and he declined to part due to pain in LLE and hurts if therapy gets him up on it. RN in room to explain need to keep moving and will give pain meds prior to OT coming this pm session. Attempted again this pm and pt again declined to part in therapy. Will attempt again tomorrow as time allows.

## 2023-03-30 NOTE — PROGRESS NOTES
[facility] Wound Ostomy Continence Nurse  Progress Note       Beck Salazar  AGE: 50 y.o. GENDER: male  : 1970  UNIT: 6K-23/023-A  TODAY'S DATE:  3/30/2023  ADMISSION DATE: 3/24/2023 10:23 PM  Subjective:     Reason for [address] Evaluation and Assessment: LLE wound      Cori Craft is a 50 y.o. male referred by:   [] Physician/PA/APRN  [x] Nursing  [] Other:     Wound Identification:  Wound Type:  cellulitis with open wound  Contributing Factors: obesity    Objective:     Justino Risk Score: Justino Scale Score: 20    Assessment:     Encounter: Present to pt room for consult of LLE wound. Pt admitted with cellulitis of LLE. Pt noted to have open area with blistering/slough to LLE. Pt has doppler ordered for today. Pt c/o tenderness around posterior knee area. Cleansed wound with normal saline and gauze. Pat dry with clean gauze. Applied cuticerin to open areas, covered with ABD pads and secured with kerlix. Staff to change each shift and as needed. If compression warranted, will need order per attending post doppler results. Pt may or may not be able to tolerate compression d/t wound and leg tenderness. Bed in low, call light in reach. Call wound ostomy as needed. Wound type: cellulitis with blistering/superficial open area/slough  Undermining or Tunneling: none  Wound assessment/color: red, pink, yellow, white  Drainage amount: moderate  Drainage description: yellow  Odor: none  Margins: attached, unattached  Isabella wound: red, tender  Exposed structure: none        Plan:     Treatment Recommendations:   LLE: Cleanse wound with normal saline or wound cleanser and gauze. Pat dry with clean gauze. Apply cuticerin to open areas. Cover with ABD pads. Secure with kerlix. Will need order from attending if compression warranted.       Specialty Bed Required :   [x] Low Air Loss   [x] Pressure Redistribution  [] Fluid Immersion- Dolphin  [] Bariatric  [] RotoProne   [] Other:     Discharge Plan:  Placement for patient upon discharge: unknown  Patient appropriate for [address]: yes call to make appt for  follow up, will need referral

## 2023-03-30 NOTE — PROGRESS NOTES
[facility]   Nephrology progress note  3/30/2023, 1:06 PM      Pt Name:    Anny Camejo  MRN:     [id number]     YOB: 1970  Admit Date:    3/24/2023 10:23 PM    Chief Complaint: Nephrology following for SHOLA/CKD. Subjective:  Patient seen and examined  Feels well denies any complaints  No other complaints    Objective:  24HR INTAKE/OUTPUT:    Intake/Output Summary (Last 24 hours) at 3/30/2023 1306  Last data filed at 3/30/2023 0656  Gross per 24 hour   Intake --   Output 1500 ml   Net -1500 ml        Admission weight: (!) 315 lb (142.9 kg)    Wt Readings from Last 3 Encounters:   03/28/23 (!) 327 lb (148.3 kg)        Vitals :   Vitals:    03/29/23 2344 03/30/23 0423 03/30/23 0952 03/30/23 0956   BP: (!) 160/92 (!) 168/93  (!) 163/100   Pulse: 90 91  89   Resp: 16 17 18    Temp: 98.2 °F (36.8 °C) 97.7 °F (36.5 °C)  98 °F (36.7 °C)   TempSrc: Oral Oral  Oral   SpO2: 95% 95%     Weight:       Height:           Physical examination  General Appearance:  Well developed.  No distress  Neck: No accessory muscle use  Psych not agitated  Musculoskeletal:  Edema and erythema-noted on the left leg looks significantly worse  CNS grossly intact    Medications:  Infusion:    dextrose      sodium chloride 125 mL/hr at 03/27/23 1326     Meds:    lisinopril  5 mg Oral Daily    potassium chloride  20 mEq Oral BID    amLODIPine  10 mg Oral Daily    insulin lispro  0-4 Units SubCUTAneous TID     insulin lispro  0-4 Units SubCUTAneous Nightly    cefTRIAXone (ROCEPHIN) IV  2,000 mg IntraVENous Q24H    [Held by provider] clindamycin (CLEOCIN) IV  900 mg IntraVENous Q8H    vitamin D  50,000 Units Oral Weekly    cetirizine  10 mg Oral Daily    sodium chloride flush  5-40 mL IntraVENous 2 times per day    enoxaparin  30 mg SubCUTAneous Q12H    calcium replacement protocol   Other RX Placeholder       Lab Data :  CBC:   Recent Labs     03/28/23  0526 03/29/23  0539 03/30/23  0645   WBC 20.6* 17.2* 16.9*

## 2023-03-30 NOTE — PROGRESS NOTES
extremity cellulitis and DVT r/o.\"     3/26/23   Patient feeling better overall. Still with painful leg, redness moving up his leg towards his groin. Vital signs remained stable, still tachycardic. Was started on Cleocin due to increasing fever. Awaiting blood culture results. Nephrology following.    3/27/23: Blood cultures growing strep pyogenes sensitive to penicillin G, clindamycin and Rocephin. Patient had drug reaction (rash on chest and tachypnea) with ampicillin and clindamycin so was transitioned to Rocephin 2 g every 24 hours. Started on prednisone and Benadryl x3 days. Scattered wheezes on exam, possible from drug reaction but may have element of fluid overload so one dose of lasix given. CXR pending. 3/28/23: Rash improving and no more episodes of tachypnea. Chest x-ray showed some bibasilar atelectasis. Tolerating Rocephin well. Repeat blood cultures from 3/27 no growth at 24 hours. No more wheezing on exam today. Some hypertension with addition of steroids, will continue to monitor for need to increase in Norvasc. Echo pending. Will monitor for need of repeat dose Lasix. 3/29/2023: Patient reports some shortness of breath, mild scattered wheezes on exam. Patient remains hypertensive but denies symptoms. Reports improved pain in bl Les. Rash on chest improving and area of cellulitis consolidating. No fevers or chills or systemic symptoms. Hemodynamically stable. Subjective/HPI:   Camelia Brown feels better overall. He denies HA, CP, N/V/D. Rash continues to improve. Patient still hypertensive. Lisinopril added today. Patient shortness of breath improved with dose of Lasix given 3/29; will continue to monitor for signs of fluid overload. Continuing IV Rocephin. Wound ostomy consulted for wound dressing changes. Patient did report increased pain this morning that worsened with ambulation in his left lower extremity, relieved with IV Dilaudid.       PMH, SURGICAL HX, FH, SOCIAL HX reviewed and updated as needed. Medications:  Reviewed    Infusion Medications    dextrose      sodium chloride 125 mL/hr at 03/27/23 1326     Scheduled Medications    lisinopril  5 mg Oral Daily    potassium chloride  20 mEq Oral BID    amLODIPine  10 mg Oral Daily    insulin lispro  0-4 Units SubCUTAneous TID     insulin lispro  0-4 Units SubCUTAneous Nightly    cefTRIAXone (ROCEPHIN) IV  2,000 mg IntraVENous Q24H    [Held by provider] clindamycin (CLEOCIN) IV  900 mg IntraVENous Q8H    vitamin D  50,000 Units Oral Weekly    cetirizine  10 mg Oral Daily    sodium chloride flush  5-40 mL IntraVENous 2 times per day    enoxaparin  30 mg SubCUTAneous Q12H    calcium replacement protocol   Other RX Placeholder     PRN Meds: glucose, dextrose bolus **OR** dextrose bolus, glucagon (rDNA), dextrose, HYDROmorphone, albuterol sulfate HFA, ipratropium, potassium chloride **OR** potassium alternative oral replacement **OR** potassium chloride, sodium chloride flush, sodium chloride, ondansetron **OR** ondansetron, polyethylene glycol, acetaminophen **OR** acetaminophen      Intake/Output Summary (Last 24 hours) at 3/30/2023 0820  Last data filed at 3/30/2023 0656  Gross per 24 hour   Intake --   Output 1500 ml   Net -1500 ml         Exam:  BP (!) 168/93   Pulse 91   Temp 97.7 °F (36.5 °C) (Oral)   Resp 17   Ht 5' 9\" (1.753 m)   Wt (!) 327 lb (148.3 kg)   SpO2 95%   BMI 48.29 kg/m²     Physical Exam  Constitutional:       General: He is not in acute distress. Appearance: He is obese. HENT:      Head: Normocephalic and atraumatic. Nose: Nose normal.      Mouth/Throat:      Mouth: Mucous membranes are moist.      Pharynx: Oropharynx is clear. No oropharyngeal exudate. Eyes:      General: No scleral icterus. Extraocular Movements: Extraocular movements intact. Conjunctiva/sclera: Conjunctivae normal.   Cardiovascular:      Rate and Rhythm: Normal rate and regular rhythm.       Pulses: Normal

## 2023-03-30 NOTE — PROGRESS NOTES
that has L LE swelling and redness. Pt participated fairly well with his pain. Pt was generally Independent for mobility in [facility] and is at [facility] today. Pt would benefit from continued skilled PT to address strengthening, balance, bed mobility, endurance building, and functional mobility training. Therapy Prognosis: Good    Requires PT Follow-Up: Yes    Discharge Recommendations:  Discharge Recommendations: Continue to assess pending progress, Patient would benefit from continued therapy after discharge    Patient Education:      . Patient Education  Education Given To: Patient  Education Provided: Role of Therapy, Plan of Care, Home Exercise Program  Education Method: Demonstration, Verbal  Barriers to Learning: Hearing  Education Outcome: Verbalized understanding, Demonstrated understanding, Continued education needed       Equipment Recommendations: Other: monitor for needs    Plan:  Current Treatment Recommendations: Strengthening, Balance training, Endurance training, Functional mobility training, Transfer training, Gait training, Home exercise program, Patient/Caregiver education & training, Safety education & training, Therapeutic activities, Equipment evaluation, education, & procurement  General Plan:  (5x GM)    Goals:  Patient Goals : go home  Short Term Goals  Time Frame for Short Term Goals: at discharge  Short Term Goal 1: Pt to be Mod I for supine <> sit to get in/out of bed  Short Term Goal 2: Pt to be Mod I for sit <> stand to get up to ambulate  Short Term Goal 3: Pt to ambulate >50 ft with/without AD with Supervision for household distances  Long Term Goals  Time Frame for Long Term Goals : not set due to short ELOS    Following session, patient left in safe position with all fall risk precautions in place. Idania Encinas.  Abhishek Sumner, [place] [address]

## 2023-03-30 NOTE — ADT AUTH CERT
Patient Demographics    Name Patient ID SSN Gender Identity Birth Date   Anahi Todd [id number]  Male [birth year] (52 yrs)     Address Phone Email Employer    [address] [employer]   [address] [place] [place] [phone] (J)   [phone] (M) [email or web address] [employer] Occupation Emp Status    [employer] White (non-) -- Full Time      Reg Status PCP Date Last Verified Next Review Date    Verified Karolynn Najjar  [phone] 23      Admission Date Discharge Date Admitting Provider     23 -- Soco Mackay MD       Marital Status Bahai       None        Emergency Contact 1   Farooq Escobar (2) [phone] Chacortacesar Cervantes)     [address] [place] [address] [de-identified]  [address] [facility] Name/Sex/Relation Subscriber  Subscriber Address/Phone Subscriber Emp/Emp Phone   1.  Yulia Coronado   [id number] Mari Davidson - Male   (Self) 1970 [address] [employer]   [address]   [phone](G) OTHER      Utilization Reviews       Cellulitis - Care Day 6 (3/29/2023) by Tanya Forde RN       Review Entered Review Status   3/29/2023 1502 Completed      Criteria Review      Care Day: 6 Care Date: 3/29/2023 Level of Care: Inpatient Floor    Guideline Day 3    Clinical Status    (X) * Hemodynamic stability    3/29/2023 3:02 PM EDT by Anita Sharma      HR 90 /91 152/91    (X) * Afebrile or fever improved    3/29/2023 3:02 PM EDT by Alejandra Morrell 97.9F    (X) * Skin exam stable or improved    3/29/2023 3:02 PM EDT by Anita Sharma      Edema and erythema-noted on the left leg getting better    ( ) * Mental status at baseline    3/29/2023 3:02 PM EDT by Anita Sharma      agitated    ( ) * Antibiotic treatment needs appropriate for next level of care    3/29/2023 3:02 PM EDT by Anita Sharma      on iv abx    ( ) * Pain absent or manageable at next level of care    3/29/2023 3:02 PM EDT by Anita Sharma      PS 9/10 left leg    ( ) * Discharge plans and education 11:58 AM EDT by Amanda Sims      tylenol 325mg once po, 650mg prn po x2    ( ) * Oral diet or acceptable for next level of care    3/28/2023 11:58 AM EDT by Amanda Sims      DIET; Regular    Interventions    (X) WBC    3/28/2023 11:58 AM EDT by Amanda Sims      3/26/23 18:09  WBC: 12.5 (H)    Medications    ( ) Parenteral or oral antibiotics    3/28/2023 11:58 AM EDT by Amanda Sims      omnipen 2G q4h iv  cleocin 900mg q8h iv    * Milestone   Additional Notes   DATE: 3/26/2023      PERTINENT UPDATES:   Febrile   Tachycardic   PS 9/10 Left leg, dilaudid IV   On omnipen IV   Tachypneic RR 22 20 28   On bronchodilators   Elevated wbc   IVF      VITALS:   O2 sat 94% on RA      ABNL/PERTINENT LABS/RADIOLOGY/DIAGNOSTIC STUDIES:   3/26/23 01:40   Calcium, Ionized: 1.04 (L)   Lactic Acid: 2.3 (H)   Albumin: 3.0 (L)      3/26/23 09:34   Sodium: 132 (L)   Potassium: 3.6    3.4 (L)   Chloride: 95 (L)   BUN,BUNPL: 36 (H)   Creatinine: 1.3 (H)   Lactic Acid: 2.2 (H)   Glucose, Random: 174 (H)   CALCIUM, SERUM, 220521: 8.3 (L)   Total Protein: 5.7 (L)   Albumin: 2.7 (L)   Vit D, 25-Hydroxy: 9 (L)      3/26/23 12:39   Calcium, Ionized: 0.80 (L)      3/26/23 00:43   Blood, Urine: MODERATE !   pH, UA: 6.0   Protein, UA: 100 !      3/26/23 00:43   Microalb/Creat Ratio: 51 (H)      PHYSICAL EXAM:   Skin:      General: Skin is warm. Findings: Erythema (B on his left lower extremity with swelling. It appears the lower distal calf is most inflamed on the medial side and it progresses up close to patient's groin area.) present. Neurological:       General: No focal deficit present. Mental Status: He is alert.        Comments: Patient noted to have decreased hearing chronically       MD CONSULTS/ASSESSMENT AND PLAN:   ## IM       Sepsis with bacteremia 2/2 cellulitis of left lower extremity   Given 3 L IV fluid bolus on 3/25/2023   BioFire growing strep pyogenous group A, blood cultures growing gram-positive cocci in

## 2023-03-31 LAB
ANION GAP SERPL CALC-SCNC: 6 MEQ/L (ref 8–16)
BASOPHILS ABSOLUTE: 0.1 THOU/MM3 (ref 0–0.1)
BASOPHILS NFR BLD AUTO: 0.8 %
BUN SERPL-MCNC: 18 MG/DL (ref 7–22)
CA-I BLD ISE-SCNC: 1.01 MMOL/L (ref 1.12–1.32)
CALCIUM SERPL-MCNC: 7.9 MG/DL (ref 8.5–10.5)
CHLORIDE SERPL-SCNC: 98 MEQ/L (ref 98–111)
CO2 SERPL-SCNC: 29 MEQ/L (ref 23–33)
CREAT SERPL-MCNC: 0.9 MG/DL (ref 0.4–1.2)
DEPRECATED RDW RBC AUTO: 47.1 FL (ref 35–45)
EOSINOPHIL NFR BLD AUTO: 1.3 %
EOSINOPHILS ABSOLUTE: 0.2 THOU/MM3 (ref 0–0.4)
ERYTHROCYTE [DISTWIDTH] IN BLOOD BY AUTOMATED COUNT: 14.3 % (ref 11.5–14.5)
GFR SERPL CREATININE-BSD FRML MDRD: > 60 ML/MIN/1.73M2
GLUCOSE BLD STRIP.AUTO-MCNC: 106 MG/DL (ref 70–108)
GLUCOSE BLD STRIP.AUTO-MCNC: 120 MG/DL (ref 70–108)
GLUCOSE BLD STRIP.AUTO-MCNC: 133 MG/DL (ref 70–108)
GLUCOSE BLD STRIP.AUTO-MCNC: 137 MG/DL (ref 70–108)
GLUCOSE SERPL-MCNC: 135 MG/DL (ref 70–108)
HCT VFR BLD AUTO: 41.7 % (ref 42–52)
HGB BLD-MCNC: 13.4 GM/DL (ref 14–18)
IMM GRANULOCYTES # BLD AUTO: 1.38 THOU/MM3 (ref 0–0.07)
IMM GRANULOCYTES NFR BLD AUTO: 8.7 %
LYMPHOCYTES ABSOLUTE: 1.9 THOU/MM3 (ref 1–4.8)
LYMPHOCYTES NFR BLD AUTO: 12.1 %
MCH RBC QN AUTO: 29 PG (ref 26–33)
MCHC RBC AUTO-ENTMCNC: 32.1 GM/DL (ref 32.2–35.5)
MCV RBC AUTO: 90.3 FL (ref 80–94)
MONOCYTES ABSOLUTE: 1 THOU/MM3 (ref 0.4–1.3)
MONOCYTES NFR BLD AUTO: 6.1 %
NEUTROPHILS NFR BLD AUTO: 71 %
NRBC BLD AUTO-RTO: 0 /100 WBC
PLATELET # BLD AUTO: 348 THOU/MM3 (ref 130–400)
PLATELET BLD QL SMEAR: ADEQUATE
PMV BLD AUTO: 9.1 FL (ref 9.4–12.4)
POTASSIUM SERPL-SCNC: 3.8 MEQ/L (ref 3.5–5.2)
RBC # BLD AUTO: 4.62 MILL/MM3 (ref 4.7–6.1)
SCAN OF BLOOD SMEAR: NORMAL
SEGMENTED NEUTROPHILS ABSOLUTE COUNT: 11.3 THOU/MM3 (ref 1.8–7.7)
SODIUM SERPL-SCNC: 133 MEQ/L (ref 135–145)
TOXIC GRANULES BLD QL SMEAR: PRESENT
WBC # BLD AUTO: 15.9 THOU/MM3 (ref 4.8–10.8)

## 2023-03-31 PROCEDURE — 2580000003 HC RX 258

## 2023-03-31 PROCEDURE — 6360000002 HC RX W HCPCS

## 2023-03-31 PROCEDURE — 6370000000 HC RX 637 (ALT 250 FOR IP): Performed by: PHYSICIAN ASSISTANT

## 2023-03-31 PROCEDURE — 2580000003 HC RX 258: Performed by: PHYSICIAN ASSISTANT

## 2023-03-31 PROCEDURE — 80048 BASIC METABOLIC PNL TOTAL CA: CPT

## 2023-03-31 PROCEDURE — 99232 SBSQ HOSP IP/OBS MODERATE 35: CPT | Performed by: INTERNAL MEDICINE

## 2023-03-31 PROCEDURE — 1200000000 HC SEMI PRIVATE

## 2023-03-31 PROCEDURE — 36415 COLL VENOUS BLD VENIPUNCTURE: CPT

## 2023-03-31 PROCEDURE — 97530 THERAPEUTIC ACTIVITIES: CPT

## 2023-03-31 PROCEDURE — 6370000000 HC RX 637 (ALT 250 FOR IP): Performed by: INTERNAL MEDICINE

## 2023-03-31 PROCEDURE — 85025 COMPLETE CBC W/AUTO DIFF WBC: CPT

## 2023-03-31 PROCEDURE — 82948 REAGENT STRIP/BLOOD GLUCOSE: CPT

## 2023-03-31 PROCEDURE — 6370000000 HC RX 637 (ALT 250 FOR IP)

## 2023-03-31 PROCEDURE — 6360000002 HC RX W HCPCS: Performed by: PHYSICIAN ASSISTANT

## 2023-03-31 PROCEDURE — 82330 ASSAY OF CALCIUM: CPT

## 2023-03-31 PROCEDURE — 97110 THERAPEUTIC EXERCISES: CPT

## 2023-03-31 PROCEDURE — 93010 ELECTROCARDIOGRAM REPORT: CPT | Performed by: INTERNAL MEDICINE

## 2023-03-31 PROCEDURE — 93005 ELECTROCARDIOGRAM TRACING: CPT

## 2023-03-31 RX ADMIN — SODIUM CHLORIDE, PRESERVATIVE FREE 10 ML: 5 INJECTION INTRAVENOUS at 09:41

## 2023-03-31 RX ADMIN — POTASSIUM CHLORIDE 20 MEQ: 1500 TABLET, EXTENDED RELEASE ORAL at 09:39

## 2023-03-31 RX ADMIN — SODIUM CHLORIDE, PRESERVATIVE FREE 10 ML: 5 INJECTION INTRAVENOUS at 21:41

## 2023-03-31 RX ADMIN — ENOXAPARIN SODIUM 30 MG: 100 INJECTION SUBCUTANEOUS at 17:26

## 2023-03-31 RX ADMIN — CETIRIZINE HYDROCHLORIDE 10 MG: 10 TABLET, FILM COATED ORAL at 09:40

## 2023-03-31 RX ADMIN — POTASSIUM CHLORIDE 20 MEQ: 1500 TABLET, EXTENDED RELEASE ORAL at 21:42

## 2023-03-31 RX ADMIN — ENOXAPARIN SODIUM 30 MG: 100 INJECTION SUBCUTANEOUS at 05:11

## 2023-03-31 RX ADMIN — ACETAMINOPHEN 650 MG: 325 TABLET ORAL at 09:39

## 2023-03-31 RX ADMIN — AMLODIPINE BESYLATE 10 MG: 10 TABLET ORAL at 09:40

## 2023-03-31 RX ADMIN — CALCIUM GLUCONATE 4000 MG: 98 INJECTION, SOLUTION INTRAVENOUS at 18:06

## 2023-03-31 RX ADMIN — LISINOPRIL 5 MG: 5 TABLET ORAL at 09:39

## 2023-03-31 RX ADMIN — CEFTRIAXONE SODIUM 2000 MG: 2 INJECTION, POWDER, FOR SOLUTION INTRAMUSCULAR; INTRAVENOUS at 17:26

## 2023-03-31 NOTE — PROGRESS NOTES
Patient has been medicated for LLE leg pain per MAR. Wound care nursing assessed wound and dressed the wound today. Kerlex remained intact over non-adhering dressing. Voiding without difficulty per urinal. Patient went down for doppler studies this afternoon. IVPB infused without difficulty this evening. Call light within reach, bed in low position.

## 2023-03-31 NOTE — PROGRESS NOTES
[facility] RENAL TELEMETRY 6K  Occupational Therapy  Daily Note  Time:   Time In: 4489  Time Out: 9801  Timed Code Treatment Minutes: 23 Minutes  Minutes: 23          Date: 3/31/2023  Patient Name: Norma Lazaro,   Gender: male      Room: [facility]023-  MRN: [id number]  : 1970  (50 y.o.)  Referring Practitioner: Corinna Negro DO  Diagnosis: Septicemia  Additional Pertinent Hx: per H&P, \"Patient presents to the ED with left lower extremity swelling and redness for the past 3 days. The patient denies any recent injury, wounds, or rash on the lower extremity. The patient has no significant medical history including no history of DVT. There are no fevers, chills, or shortness of breath. Patient is admitted for left lower extremity cellulitis and DVT r/o. \"    Restrictions/Precautions:  Restrictions/Precautions: General Precautions, Fall Risk  Position Activity Restriction  Other position/activity restrictions: LLE Cellulitis     SUBJECTIVE: patient supine in bed upon OT arrival and difficult to initially rouse. Patient required MOD encouragement to participate in OOB activity and edu on importance of increasing activity and progressing toward his PLOF to return home safely. Patient demo understanding, however continued to delay participating in OOB activity. Patient A & O x 3. Hard of hearing. PAIN: 10+/10: L knee/LE-informed patient he needs to ask for pain meds    Vitals: Vitals not assessed per clinical judgement, see nursing flowsheet    COGNITION: Decreased Insight and Decreased Problem Solving    ADL:   Footwear Management: Stand By Assistance. For donning \"crocs\" seated EOB ,however L shoe would not fit on foot to safely   and shoes were removed with MAX A. Patient has very swollen feet. Juan Daniel Smith BALANCE:  Sitting Balance:  Stand By Assistance. Standing Balance: Contact Guard Assistance, Minimal Assistance.  With use of 2 w/w for support and leaning heavily on forearms toileting. Short Term Goal 4: Pt will complete LB dressing with LHAE PRN and min A to increase indep and endurance within home environment. Short Term Goal 5: Pt will complete toileting task including hygiene with SBA to increase indep in home environment. Additional Goals?: No    Following session, patient left in safe position with all fall risk precautions in place.

## 2023-03-31 NOTE — PLAN OF CARE
Problem: Discharge Planning  Goal: Discharge to home or other facility with appropriate resources  3/31/2023 1106 by Abena Montgomery RN  Outcome: Progressing  3/31/2023 1105 by Abena Montgomery RN  Outcome: Progressing  Flowsheets  Taken 3/31/2023 1105  Discharge to home or other facility with appropriate resources:   Identify barriers to discharge with patient and caregiver   Arrange for needed discharge resources and transportation as appropriate  Taken 3/31/2023 0940  Discharge to home or other facility with appropriate resources: Identify barriers to discharge with patient and caregiver  3/30/2023 2330 by Lucy Martinez RN  Outcome: Progressing  Flowsheets (Taken 3/30/2023 2025)  Discharge to home or other facility with appropriate resources: Identify barriers to discharge with patient and caregiver     Problem: Pain  Goal: Verbalizes/displays adequate comfort level or baseline comfort level  3/31/2023 1106 by Abena Montgomery RN  Outcome: Progressing  3/31/2023 1105 by Abena Montgomery RN  Flowsheets (Taken 3/31/2023 1105)  Verbalizes/displays adequate comfort level or baseline comfort level:   Encourage patient to monitor pain and request assistance   Assess pain using appropriate pain scale  3/30/2023 2330 by Lucy Martinez RN  Outcome: Progressing  Flowsheets (Taken 3/30/2023 1933)  Verbalizes/displays adequate comfort level or baseline comfort level: Encourage patient to monitor pain and request assistance     Problem: Safety - Adult  Goal: Free from fall injury  3/31/2023 1106 by Abena Montgomery RN  Outcome: Progressing  3/31/2023 1105 by Abena Montgomery RN  Flowsheets  Taken 3/31/2023 1105  Free From Fall Injury: Instruct family/caregiver on patient safety  Taken 3/31/2023 0940  Free From Fall Injury: Instruct family/caregiver on patient safety  3/30/2023 2330 by Lucy Martinez RN  Outcome: Progressing     Problem: Skin/Tissue Integrity  Goal: Absence of new skin breakdown  Description: 1.   Monitor needed     Problem: Infection - Adult  Goal: Absence of infection at discharge  3/31/2023 1106 by Renea Soto RN  Outcome: Progressing  3/31/2023 1105 by Renea Soto RN  Flowsheets  Taken 3/31/2023 1105  Absence of infection at discharge:   Assess and monitor for signs and symptoms of infection   Monitor lab/diagnostic results  Taken 3/31/2023 0940  Absence of infection at discharge: Assess and monitor for signs and symptoms of infection  3/30/2023 2330 by Chemo Snyder RN  Outcome: Progressing  Flowsheets (Taken 3/30/2023 2025)  Absence of infection at discharge: Assess and monitor for signs and symptoms of infection     Problem: Chronic Conditions and Co-morbidities  Goal: Patient's chronic conditions and co-morbidity symptoms are monitored and maintained or improved  3/31/2023 1106 by Renea Soto RN  Outcome: Progressing  3/31/2023 1105 by Renea Soto RN  Flowsheets  Taken 3/31/2023 1105  Care Plan - Patient's Chronic Conditions and Co-Morbidity Symptoms are Monitored and Maintained or Improved:   Monitor and assess patient's chronic conditions and comorbid symptoms for stability, deterioration, or improvement   Collaborate with multidisciplinary team to address chronic and comorbid conditions and prevent exacerbation or deterioration  Taken 3/31/2023 0940  Care Plan - Patient's Chronic Conditions and Co-Morbidity Symptoms are Monitored and Maintained or Improved: Monitor and assess patient's chronic conditions and comorbid symptoms for stability, deterioration, or improvement  3/30/2023 2330 by Chemo Snyder RN  Outcome: Progressing  Flowsheets (Taken 3/30/2023 2025)  Care Plan - Patient's Chronic Conditions and Co-Morbidity Symptoms are Monitored and Maintained or Improved: Monitor and assess patient's chronic conditions and comorbid symptoms for stability, deterioration, or improvement

## 2023-03-31 NOTE — PROGRESS NOTES
[facility]   Nephrology progress note  3/31/2023, 10:47 AM      Pt Name:    Starr Saha  MRN:     [id number]     YOB: 1970  Admit Date:    3/24/2023 10:23 PM    Chief Complaint: Nephrology following for SHOLA/CKD. Subjective:  Patient seen and examined  Feels well denies any complaints  No other complaints    Objective:  24HR INTAKE/OUTPUT:    Intake/Output Summary (Last 24 hours) at 3/31/2023 1047  Last data filed at 3/31/2023 0953  Gross per 24 hour   Intake 1900 ml   Output 1250 ml   Net 650 ml        Admission weight: (!) 315 lb (142.9 kg)    Wt Readings from Last 3 Encounters:   03/31/23 (!) 310 lb (140.6 kg)        Vitals :   Vitals:    03/30/23 1933 03/31/23 0013 03/31/23 0339 03/31/23 0519   BP: (!) 158/87 (!) 160/88 (!) 155/90    Pulse: 97 (!) 102 96    Resp: 24 22 20    Temp: 98.2 °F (36.8 °C) 98.1 °F (36.7 °C) 98.4 °F (36.9 °C)    TempSrc: Oral Oral Oral    SpO2: 94% 96% 94%    Weight:   (!) 310 lb 6.5 oz (140.8 kg) (!) 310 lb (140.6 kg)   Height:           Physical examination  General Appearance:  Well developed.  No distress  Neck: No accessory muscle use  Psych not agitated  Musculoskeletal:  Edema noted left leg in dressing noted as well  CNS grossly intact    Medications:  Infusion:    dextrose      sodium chloride 125 mL/hr at 03/27/23 1326     Meds:    lisinopril  5 mg Oral Daily    potassium chloride  20 mEq Oral BID    amLODIPine  10 mg Oral Daily    insulin lispro  0-4 Units SubCUTAneous TID WC    insulin lispro  0-4 Units SubCUTAneous Nightly    cefTRIAXone (ROCEPHIN) IV  2,000 mg IntraVENous Q24H    [Held by provider] clindamycin (CLEOCIN) IV  900 mg IntraVENous Q8H    vitamin D  50,000 Units Oral Weekly    cetirizine  10 mg Oral Daily    sodium chloride flush  5-40 mL IntraVENous 2 times per day    enoxaparin  30 mg SubCUTAneous Q12H    calcium replacement protocol   Other RX Placeholder       Lab Data :  CBC:   Recent Labs     03/29/23  0532 03/30/23  0645 03/31/23  0654   WBC 17.2* 16.9* 15.9*   HGB 13.6* 13.5* 13.4*   HCT 43.3 42.6 41.7*    354 348       CMP:  Recent Labs     03/29/23  0539 03/30/23  0645 03/31/23  0511    137 133*   K 3.3* 4.1 3.8    99 98   CO2 30 28 29   BUN 28* 21 18   CREATININE 1.0 0.9 0.9   GLUCOSE 134* 142* 135*   CALCIUM 8.1* 7.8* 7.9*   MG 2.3  --   --        Hepatic:   No results for input(s): LABALBU, AST, ALT, ALB, BILITOT, ALKPHOS in the last 72 hours. Assessment and Plan:  Renal -acute kidney injury again etiology not clear may be due to sepsis/prerenal  Overall creatinine improved with IV fluids  Renal ultrasound scan shows large kidneys  Overall creatinine stabilized  Electrolytes -hypokalemia much better continue p.o. KCl for now  Blood pressure fluctuating but better  Left leg cellulitis on antibiotics appears to be slightly worse today  Hypocalcemia-corrected calcium appears to be reasonable. Continue vitamin D  Meds reviewed. Discussed with the patient    Overall renal issues resolved will sign off call with any questions or concerns or if situation changes    Latrell Chadwick MD  [facility]    This report has been created using voice recognition software.  It may contain minor errors which are inherent in voice recognition technology

## 2023-03-31 NOTE — PLAN OF CARE
Problem: Discharge Planning  Goal: Discharge to home or other facility with appropriate resources  Outcome: Progressing     Problem: Pain  Goal: Verbalizes/displays adequate comfort level or baseline comfort level  Outcome: Progressing  Flowsheets (Taken 3/30/2023 1933)  Verbalizes/displays adequate comfort level or baseline comfort level: Encourage patient to monitor pain and request assistance     Problem: Safety - Adult  Goal: Free from fall injury  Outcome: Progressing     Problem: Skin/Tissue Integrity  Goal: Absence of new skin breakdown  Description: 1. Monitor for areas of redness and/or skin breakdown  2. Assess vascular access sites hourly  3. Every 4-6 hours minimum:  Change oxygen saturation probe site  4. Every 4-6 hours:  If on nasal continuous positive airway pressure, respiratory therapy assess nares and determine need for appliance change or resting period. Outcome: Progressing     Problem: Skin/Tissue Integrity - Adult  Goal: Incisions, wounds, or drain sites healing without S/S of infection  Outcome: Progressing  Flowsheets (Taken 3/30/2023 2025)  Incisions, Wounds, or Drain Sites Healing Without Sign and Symptoms of Infection: TWICE DAILY: Assess and document skin integrity     Problem: Musculoskeletal - Adult  Goal: Return ADL status to a safe level of function  Outcome: Progressing     Problem: Infection - Adult  Goal: Absence of infection at discharge  Outcome: Progressing     Problem: Chronic Conditions and Co-morbidities  Goal: Patient's chronic conditions and co-morbidity symptoms are monitored and maintained or improved  Outcome: Progressing   Care plan reviewed with pt.

## 2023-03-31 NOTE — PROGRESS NOTES
[address] [address]  INPATIENT PHYSICAL THERAPY  Daily Note  [facility] RENAL TELEMETRY 6K - 6K-23/023-A    Time In: 0468  Time Out: 1000  Timed Code Treatment Minutes: 25 Minutes  Minutes: 25          Date: 3/31/2023  Patient Name: Meghan Graves,  Gender:  male        MRN: [id number]  : 1970  (50 y.o.)     Referring Practitioner: Jemma Tang DO  Diagnosis: Septicemia  Additional Pertinent Hx: per H&P, \"Patient presents to the ED with left lower extremity swelling and redness for the past 3 days. The patient denies any recent injury, wounds, or rash on the lower extremity. The patient has no significant medical history including no history of DVT. There are no fevers, chills, or shortness of breath. Patient is admitted for left lower extremity cellulitis and DVT r/o. \"     Prior Level of Function:  Lives With: Spouse  Type of Home: House  Home Layout: One level  Home Access: Ramped entrance   Bathroom Shower/Tub: Tub/Shower unit  Bathroom Toilet: Standard  Bathroom Accessibility: Accessible    ADL Assistance: Independent  Homemaking Assistance: Needs assistance (wife completes)  Ambulation Assistance: Independent  Transfer Assistance: Independent  Active : Yes  Additional Comments: d/t Ruby unsure of accuracy of information. Restrictions/Precautions:  Restrictions/Precautions: General Precautions, Fall Risk  Position Activity Restriction  Other position/activity restrictions: LLE Cellulitis     SUBJECTIVE: Pt. Laying in his bed and pleasantly agrees to therapy session. RN approved therapy session and present upon arrival. Pt. Declines OOB activity secondary to pain but agrees to ther ex. Extra time required secondary to communication barriers [facility]). Communication board utilized.      Pain: 10/10: L LE    Vitals:   Patient Vitals for the past 8 hrs:   BP Patient Position Temp Temp src Pulse Resp SpO2 O2 Device   23 0339 (!) 155/90 Supine 98.4 °F (36.9 °C) Oral 96 20 94 % None (Room air) *Vitals may reflect data entered into the flowsheet prior to or after PT session was completed. OBJECTIVE:  Bed Mobility:  Not Tested    Transfers:  Not Tested    Ambulation:  None    Stairs:  None    Balance:  None    Neuromuscular Re-education  None    Exercise:  Patient was guided in 1 set(s) 10 reps of exercises: Ankle pumps, Glut sets, Quad sets, Heelslides, Short arc quads, Hip abduction/adduction and Straight leg raises. Exercises were completed for increased independence with functional mobility. Extra time required secondary to pain and fatigue. Functional Outcome Measures:   Not completed    ASSESSMENT:  Assessment: Patient progressing toward established goals. Activity Tolerance:  Patient tolerance of  treatment: fair. Limited by pain    Equipment Recommendations: Other: monitor for needs  Discharge Recommendations: Continue to assess pending progress, Patient would benefit from continued therapy after discharge    PLAN: Current Treatment Recommendations: Strengthening, Balance training, Endurance training, Functional mobility training, Transfer training, Gait training, Home exercise program, Patient/Caregiver education & training, Safety education & training, Therapeutic activities, Equipment evaluation, education, & procurement  General Plan:  (5x GM)    Patient Education  Patient Education: Plan of Care, Reviewed Prior Education, Health Promotion and Wellness Education, Safety, Verbal Exercise Instruction    Goals:  Patient Goals : go home  Short Term Goals  Time Frame for Short Term Goals: at discharge  Short Term Goal 1: Pt to be Mod I for supine <> sit to get in/out of bed  Short Term Goal 2: Pt to be Mod I for sit <> stand to get up to ambulate  Short Term Goal 3: Pt to ambulate >50 ft with/without AD with Supervision for household distances  Long Term Goals  Time Frame for Long Term Goals : not set due to short ELOS    Following session, patient left in safe position with all fall

## 2023-03-31 NOTE — ADT AUTH CERT
Utilization Reviews       Clinical Image by Zoey Johansen RN       Review Status Review Entered   In Primary 3/31/2023 1029       Created By   Zoey Johansen RN      Criteria Review           Cellulitis - Care Day 7 (3/30/2023) by Zoey Johansen RN       Review Status Review Entered   Completed 3/31/2023 1027       Created By   Zoey Johansen RN      Criteria Review      Care Day: 7 Care Date: 3/30/2023 Level of Care: Inpatient Floor    Guideline Day 3    Clinical Status    ( ) * Hemodynamic stability    3/31/2023 10:27 AM EDT by Cherrie Bergeron      MI: 103  RR: 20 24  BP:168/93 163/100 155/86 158/87  SPO2: 95% RA    (X) * Afebrile or fever improved    3/31/2023 10:27 AM EDT by Pauline Carcamo: 98.2    ( ) * Skin exam stable or improved    3/31/2023 10:27 AM EDT by Cherrie Bergeron      vascular discoloration noted  Dry  + weeping of wound    (X) * Mental status at baseline    3/31/2023 10:27 AM EDT by Kathrin Ceja    ( ) * Antibiotic treatment needs appropriate for next level of care    3/31/2023 10:27 AM EDT by Marc Reilly to require IV Abx    ( ) * Pain absent or manageable at next level of care    3/31/2023 10:27 AM EDT by Cherrie Bergeron      Pain Scale: 10    ( ) * Discharge plans and education understood    Activity    ( ) * Ambulatory or acceptable for next level of care    3/31/2023 10:27 AM EDT by Cherrie Bergeron      PT:   AM-PAC Score : 16  Assessment:  Decreased functional mobility , Decreased endurance, Increased pain, Decreased    Routes    (X) * Oral hydration    3/31/2023 10:27 AM EDT by Cherrie Bergeron      PO hydration    ( ) * Oral medications or regimen acceptable for next level of care    3/31/2023 10:27 AM EDT by Cherrie Bergeron      HYDROmorphone 0.25 mg Q 4 HOURS PRN IV x 3  amLODIPine 10 mg DAILY PO  lisinopril  5 mg DAILY PO  potassium chloride 20 mEq BID PO  cetirizine 10 mg  DAILY PO  acetaminophen 650 mg Q 6 HOURS PRN PO x 1    (X) * Oral diet or acceptable for next level of care    3/31/2023 10:27 AM EDT by Kenzie SCHMID;  Regular    Interventions    (X) WBC    3/31/2023 10:27 AM EDT by Alesha Delvalle      WBC: 16.9 (H)    Medications    (X) Parenteral or oral antibiotics    3/31/2023 10:27 AM EDT by Alesha Delvalle      cefTRIAXone 2,000 mg Q 24 HOURS IV       Definitions for Care Day 7    Hemodynamic stability    ( ) Hemodynamic stability, as indicated by  1 or more  of the following :       ( ) Patient hemodynamically stable, as indicated by  ALL  of the following  (1) (2) (3) (4) (5):          (X) Hypotension absent       Hypotension absent    (X) Hypotension absent, as indicated by  1 or more  of the following  (1) (2) (3) (4):       (X) SBP greater than or equal to 90 mm Hg and without recent decrease greater than 40 mm Hg from       baseline in adult or child 10 years or older       (X) Mean arterial pressure [A] greater than or equal to 70 mm Hg in adult or child 10 years or       older       * Milestone   Additional Notes   DATE: 03/30/23      PERTINENT UPDATES:   Noted elevation in BP, episode of tachycardi and tachypnea   Continues to C/O pain worsened with ambulation in his left lower extremity   + Leukocytosis requiring continuous monitor of CBC   Infusing IV cefTRIAXone, IV HYDROmorphone       ABNL/PERTINENT LABS/RADIOLOGY/DIAGNOSTIC STUDIES:   Calcium, Ionized: 0.96 (L)   Glucose, Random: 142 (H)   CALCIUM, SERUM, 7.8 (L)   WBC: 16.9 (H)   Hemoglobin Quant: 13.5 (L)      PHYSICAL EXAM:      MD CONSULTS/ASSESSMENTS & PLANS:    ###Internal Medicine###   Sepsis with bacteremia 2/2 cellulitis of the left lower extremity   -S/p 3L IV fluid bolus on 3/25/2023   -BioFire with strep pyogenes; blood cultures-3/23 growing strep pyogenes sensitive to penicillin G, vancomycin and Rocephin   -Elevated WBCs at 12.4 on admission with tachycardia; leukocytosis worsened at 17 3/27 --> 20 3/28 (prednisone likely contributing factor) --> 17.9 3/30   -Lactic

## 2023-04-01 VITALS
RESPIRATION RATE: 18 BRPM | BODY MASS INDEX: 45.91 KG/M2 | HEART RATE: 95 BPM | SYSTOLIC BLOOD PRESSURE: 134 MMHG | TEMPERATURE: 98.6 F | WEIGHT: 310 LBS | DIASTOLIC BLOOD PRESSURE: 81 MMHG | HEIGHT: 69 IN | OXYGEN SATURATION: 97 %

## 2023-04-01 LAB
ANION GAP SERPL CALC-SCNC: 9 MEQ/L (ref 8–16)
BACTERIA BLD AEROBE CULT: NORMAL
BACTERIA BLD AEROBE CULT: NORMAL
BASOPHILS ABSOLUTE: 0.1 THOU/MM3 (ref 0–0.1)
BASOPHILS NFR BLD AUTO: 0.6 %
BUN SERPL-MCNC: 17 MG/DL (ref 7–22)
CA-I BLD ISE-SCNC: 1.07 MMOL/L (ref 1.12–1.32)
CALCIUM SERPL-MCNC: 8.2 MG/DL (ref 8.5–10.5)
CHLORIDE SERPL-SCNC: 100 MEQ/L (ref 98–111)
CO2 SERPL-SCNC: 25 MEQ/L (ref 23–33)
CREAT SERPL-MCNC: 0.9 MG/DL (ref 0.4–1.2)
DEPRECATED RDW RBC AUTO: 46.4 FL (ref 35–45)
EKG ATRIAL RATE: 97 BPM
EKG P AXIS: 39 DEGREES
EKG P-R INTERVAL: 138 MS
EKG Q-T INTERVAL: 350 MS
EKG QRS DURATION: 94 MS
EKG QTC CALCULATION (BAZETT): 444 MS
EKG R AXIS: 15 DEGREES
EKG T AXIS: 28 DEGREES
EKG VENTRICULAR RATE: 97 BPM
EOSINOPHIL NFR BLD AUTO: 1 %
EOSINOPHILS ABSOLUTE: 0.1 THOU/MM3 (ref 0–0.4)
ERYTHROCYTE [DISTWIDTH] IN BLOOD BY AUTOMATED COUNT: 14.4 % (ref 11.5–14.5)
GFR SERPL CREATININE-BSD FRML MDRD: > 60 ML/MIN/1.73M2
GLUCOSE BLD STRIP.AUTO-MCNC: 109 MG/DL (ref 70–108)
GLUCOSE BLD STRIP.AUTO-MCNC: 125 MG/DL (ref 70–108)
GLUCOSE BLD STRIP.AUTO-MCNC: 143 MG/DL (ref 70–108)
GLUCOSE SERPL-MCNC: 122 MG/DL (ref 70–108)
HCT VFR BLD AUTO: 41.5 % (ref 42–52)
HGB BLD-MCNC: 13.6 GM/DL (ref 14–18)
IMM GRANULOCYTES # BLD AUTO: 0.81 THOU/MM3 (ref 0–0.07)
IMM GRANULOCYTES NFR BLD AUTO: 6.4 %
LYMPHOCYTES ABSOLUTE: 1.5 THOU/MM3 (ref 1–4.8)
LYMPHOCYTES NFR BLD AUTO: 12.1 %
MCH RBC QN AUTO: 29.2 PG (ref 26–33)
MCHC RBC AUTO-ENTMCNC: 32.8 GM/DL (ref 32.2–35.5)
MCV RBC AUTO: 89.1 FL (ref 80–94)
MONOCYTES ABSOLUTE: 0.9 THOU/MM3 (ref 0.4–1.3)
MONOCYTES NFR BLD AUTO: 6.9 %
NEUTROPHILS NFR BLD AUTO: 73 %
NRBC BLD AUTO-RTO: 0 /100 WBC
PLATELET # BLD AUTO: 317 THOU/MM3 (ref 130–400)
PLATELET BLD QL SMEAR: ADEQUATE
PMV BLD AUTO: 9.2 FL (ref 9.4–12.4)
POTASSIUM SERPL-SCNC: 4.9 MEQ/L (ref 3.5–5.2)
RBC # BLD AUTO: 4.66 MILL/MM3 (ref 4.7–6.1)
SCAN OF BLOOD SMEAR: NORMAL
SEGMENTED NEUTROPHILS ABSOLUTE COUNT: 9.3 THOU/MM3 (ref 1.8–7.7)
SODIUM SERPL-SCNC: 134 MEQ/L (ref 135–145)
WBC # BLD AUTO: 12.7 THOU/MM3 (ref 4.8–10.8)

## 2023-04-01 PROCEDURE — 2580000003 HC RX 258: Performed by: PHYSICIAN ASSISTANT

## 2023-04-01 PROCEDURE — 36415 COLL VENOUS BLD VENIPUNCTURE: CPT

## 2023-04-01 PROCEDURE — 6370000000 HC RX 637 (ALT 250 FOR IP): Performed by: PHYSICIAN ASSISTANT

## 2023-04-01 PROCEDURE — 6360000002 HC RX W HCPCS: Performed by: INTERNAL MEDICINE

## 2023-04-01 PROCEDURE — 85025 COMPLETE CBC W/AUTO DIFF WBC: CPT

## 2023-04-01 PROCEDURE — 99239 HOSP IP/OBS DSCHRG MGMT >30: CPT | Performed by: INTERNAL MEDICINE

## 2023-04-01 PROCEDURE — 82948 REAGENT STRIP/BLOOD GLUCOSE: CPT

## 2023-04-01 PROCEDURE — 6370000000 HC RX 637 (ALT 250 FOR IP)

## 2023-04-01 PROCEDURE — 80048 BASIC METABOLIC PNL TOTAL CA: CPT

## 2023-04-01 PROCEDURE — 82330 ASSAY OF CALCIUM: CPT

## 2023-04-01 PROCEDURE — 6360000002 HC RX W HCPCS: Performed by: PHYSICIAN ASSISTANT

## 2023-04-01 RX ORDER — AMLODIPINE BESYLATE 10 MG/1
10 TABLET ORAL DAILY
Qty: 30 TABLET | Refills: 1 | Status: SHIPPED | OUTPATIENT
Start: 2023-04-02

## 2023-04-01 RX ORDER — ERGOCALCIFEROL 1.25 MG/1
50000 CAPSULE ORAL WEEKLY
Qty: 5 CAPSULE | Refills: 1 | Status: SHIPPED | OUTPATIENT
Start: 2023-04-02

## 2023-04-01 RX ORDER — LISINOPRIL 5 MG/1
5 TABLET ORAL DAILY
Qty: 30 TABLET | Refills: 1 | Status: SHIPPED | OUTPATIENT
Start: 2023-04-02

## 2023-04-01 RX ORDER — CEFDINIR 300 MG/1
300 CAPSULE ORAL 2 TIMES DAILY
Qty: 20 CAPSULE | Refills: 0 | Status: SHIPPED | OUTPATIENT
Start: 2023-04-01 | End: 2023-04-11

## 2023-04-01 RX ORDER — HYDROCODONE BITARTRATE AND ACETAMINOPHEN 5; 325 MG/1; MG/1
1 TABLET ORAL EVERY 6 HOURS PRN
Qty: 28 TABLET | Refills: 0 | Status: SHIPPED | OUTPATIENT
Start: 2023-04-01 | End: 2023-04-08

## 2023-04-01 RX ADMIN — SODIUM CHLORIDE, PRESERVATIVE FREE 10 ML: 5 INJECTION INTRAVENOUS at 09:53

## 2023-04-01 RX ADMIN — CETIRIZINE HYDROCHLORIDE 10 MG: 10 TABLET, FILM COATED ORAL at 09:24

## 2023-04-01 RX ADMIN — AMLODIPINE BESYLATE 10 MG: 10 TABLET ORAL at 09:23

## 2023-04-01 RX ADMIN — ACETAMINOPHEN 650 MG: 325 TABLET ORAL at 09:24

## 2023-04-01 RX ADMIN — ENOXAPARIN SODIUM 30 MG: 100 INJECTION SUBCUTANEOUS at 02:30

## 2023-04-01 RX ADMIN — HYDROMORPHONE HYDROCHLORIDE 0.25 MG: 1 INJECTION, SOLUTION INTRAMUSCULAR; INTRAVENOUS; SUBCUTANEOUS at 00:31

## 2023-04-01 RX ADMIN — LISINOPRIL 5 MG: 5 TABLET ORAL at 09:24

## 2023-04-01 ASSESSMENT — PAIN DESCRIPTION - LOCATION
LOCATION: LEG

## 2023-04-01 ASSESSMENT — PAIN DESCRIPTION - ORIENTATION
ORIENTATION: LEFT;LOWER
ORIENTATION: LEFT
ORIENTATION: LEFT

## 2023-04-01 ASSESSMENT — PAIN SCALES - GENERAL
PAINLEVEL_OUTOF10: 3
PAINLEVEL_OUTOF10: 5
PAINLEVEL_OUTOF10: 3
PAINLEVEL_OUTOF10: 3

## 2023-04-01 NOTE — PLAN OF CARE
Problem: Discharge Planning  Goal: Discharge to home or other facility with appropriate resources  4/1/2023 1548 by Cesar Chung RN  Outcome: Completed     Problem: Pain  Goal: Verbalizes/displays adequate comfort level or baseline comfort level  4/1/2023 1548 by Cesar Chung RN  Outcome: Completed     Problem: Safety - Adult  Goal: Free from fall injury  4/1/2023 1548 by Cesar Chung RN  Outcome: Completed     Problem: Skin/Tissue Integrity  Goal: Absence of new skin breakdown  Description: 1. Monitor for areas of redness and/or skin breakdown  2. Assess vascular access sites hourly  3. Every 4-6 hours minimum:  Change oxygen saturation probe site  4. Every 4-6 hours:  If on nasal continuous positive airway pressure, respiratory therapy assess nares and determine need for appliance change or resting period. 4/1/2023 1548 by Cesar Chung RN  Outcome: Completed     Problem: Skin/Tissue Integrity - Adult  Goal: Incisions, wounds, or drain sites healing without S/S of infection  4/1/2023 1548 by Cesar Chung RN  Outcome: Completed     Problem: Musculoskeletal - Adult  Goal: Return ADL status to a safe level of function  4/1/2023 1548 by Cesar Chung RN  Outcome: Completed     Problem: Infection - Adult  Goal: Absence of infection at discharge  4/1/2023 1548 by Cesar Chung RN  Outcome: Completed     Problem: Chronic Conditions and Co-morbidities  Goal: Patient's chronic conditions and co-morbidity symptoms are monitored and maintained or improved  4/1/2023 1548 by Cesar Chung RN  Outcome: Completed   Care plan reviewed with patient. Patient verbalizes understanding of the plan of care and contributes to goal setting.

## 2023-04-01 NOTE — PLAN OF CARE
Problem: Discharge Planning  Goal: Discharge to home or other facility with appropriate resources  Outcome: Progressing     Problem: Pain  Goal: Verbalizes/displays adequate comfort level or baseline comfort level  Outcome: Progressing     Problem: Safety - Adult  Goal: Free from fall injury  Outcome: Progressing     Problem: Skin/Tissue Integrity  Goal: Absence of new skin breakdown  Description: 1. Monitor for areas of redness and/or skin breakdown  2. Assess vascular access sites hourly  3. Every 4-6 hours minimum:  Change oxygen saturation probe site  4. Every 4-6 hours:  If on nasal continuous positive airway pressure, respiratory therapy assess nares and determine need for appliance change or resting period.   Outcome: Progressing     Problem: Skin/Tissue Integrity - Adult  Goal: Incisions, wounds, or drain sites healing without S/S of infection  Outcome: Progressing     Problem: Musculoskeletal - Adult  Goal: Return ADL status to a safe level of function  Outcome: Progressing     Problem: Infection - Adult  Goal: Absence of infection at discharge  Outcome: Progressing     Problem: Chronic Conditions and Co-morbidities  Goal: Patient's chronic conditions and co-morbidity symptoms are monitored and maintained or improved  Outcome: Progressing

## 2023-04-01 NOTE — PROGRESS NOTES
Patient and wife educated on dressing changes and proper wound care. AVS reviewed with patient and wife. Both deny any further questions. Prescriptions reviewed and educated on. Prescriptions sent to [facility] in [place]. Patient discharged home with wife.

## 2023-04-01 NOTE — DISCHARGE SUMMARY
DISCHARGE SUMMARY      Patient Identification:   Ana Rosa Mena   : 1970  MRN: [id number]   Account: [de-identified]      Patient's PCP: Marcus Howard DO    Admit Date: 3/24/2023     Discharge Date:   23     Admitting Physician: Tom Sepulveda MD     Discharge Physician: Ines Tineo DO     Discharge Diagnoses:     Active Hospital Problems    Diagnosis Date Noted    Cellulitis of left lower extremity [L03.116] 2023     Anticipated Discharge: Pending clinical course    Assessment/Plan:    Sepsis with bacteremia (resolved)  2/ cellulitis of the left lower extremity  S/p 3L IV fluid bolus on 3/25/2023  [employer] with strep pyogenes; blood cultures-3/23 growing strep pyogenes sensitive to penicillin G, vancomycin and Rocephin  Elevated WBCs at 13.5 on admission with tachycardia; leukocytosis worsened at 17 3/27 --> 20 3/28 (prednisone likely contributing factor) --> 17.9 3/30 --> 15.9 3/31 --> 12.7   Lactic acidosis 2.0 3/21, improved to 1.9 3/27  3/24 started on Ancef and vancomycin, changed to ampicillin 3/25 but patient remained tachycardic and increasingly febrile with Tmax 101.3 so Cleocin started 3/26 --> patient had episode of tachypnea and developed rash on chest and abdomen --> antibiotics discontinued with improvement s/p Benadryl and prednisone --> restarted ampicillin 3/27  with recurrence of rash --> started therapy with rocephin 2g q24h, will continue to monitor for symptoms --> will transition to oral therapy with Omnicef 300 twice daily for 10 more days (2 and 23)  Repeat blood cultures 3/27 no growth at 5 days  Echo 3/27 with EF 50-55% and normal LVFx, no valve abnormalities noted  Follow-up with PCP  Cellulitis left lower extremity  Improving; area of warmth, erythema and edema decreasing  Duplex US BL LEs 3/25/23 and repeat 3/26/23 negative for DVT and showing normal arterial flow  X-ray LLE 3/23/2023 positive for soft tissue edema no signs of subcu gas or bony

## 2023-04-01 NOTE — DISCHARGE INSTRUCTIONS
Tylenol/ibuprofen as needed for mild pain     Norco given x 7 days for severe pain but do not take in conjunction with acetominophen (tylenol)    Complete 10 days additional antibiotic therapy with cefdinir 300 mg twice a day    Change dressing 1-2 times per day, rinse gently with soap and water, do not peel or exfoliate wound, keep legs raised when seated/laying down    Continue taking amlodipine and lisinopril for high blood pressure and follow up with PCP for further management    Continue taking once weekly Vitamin D    Follow up with PCP in ~ 1 week with BMP done before to monitor electrolytes    Do not return to work for about 5 - 7 days

## 2023-05-01 ENCOUNTER — HOSPITAL ENCOUNTER (OUTPATIENT)
Age: 53
Discharge: HOME | End: 2023-05-01
Payer: COMMERCIAL

## 2023-05-01 ENCOUNTER — HOSPITAL ENCOUNTER (OUTPATIENT)
Dept: GENERAL RADIOLOGY | Age: 53
Discharge: HOME | End: 2023-05-01
Payer: COMMERCIAL

## 2023-05-01 DIAGNOSIS — M25.462 EFFUSION OF LEFT KNEE: ICD-10-CM

## 2023-05-01 PROCEDURE — 73565 X-RAY EXAM OF KNEES: CPT

## 2023-05-01 PROCEDURE — 73564 X-RAY EXAM KNEE 4 OR MORE: CPT
